# Patient Record
Sex: FEMALE | Race: WHITE | NOT HISPANIC OR LATINO | Employment: UNEMPLOYED | URBAN - NONMETROPOLITAN AREA
[De-identification: names, ages, dates, MRNs, and addresses within clinical notes are randomized per-mention and may not be internally consistent; named-entity substitution may affect disease eponyms.]

---

## 2018-07-23 ENCOUNTER — TELEMEDICINE ORIGINATING SITE VISIT (OUTPATIENT)
Dept: MEDICAL GROUP | Facility: CLINIC | Age: 58
End: 2018-07-23
Payer: MEDICARE

## 2018-07-23 ENCOUNTER — TELEMEDICINE2 (OUTPATIENT)
Dept: MEDICAL GROUP | Age: 58
End: 2018-07-23
Payer: MEDICARE

## 2018-07-23 VITALS
HEIGHT: 68 IN | HEART RATE: 78 BPM | TEMPERATURE: 97.4 F | BODY MASS INDEX: 44.41 KG/M2 | OXYGEN SATURATION: 84 % | SYSTOLIC BLOOD PRESSURE: 150 MMHG | WEIGHT: 293 LBS | DIASTOLIC BLOOD PRESSURE: 73 MMHG

## 2018-07-23 DIAGNOSIS — B95.62 CELLULITIS DUE TO MRSA: ICD-10-CM

## 2018-07-23 DIAGNOSIS — L03.90 CELLULITIS DUE TO MRSA: ICD-10-CM

## 2018-07-23 DIAGNOSIS — E78.5 DYSLIPIDEMIA: ICD-10-CM

## 2018-07-23 DIAGNOSIS — G47.33 OSA (OBSTRUCTIVE SLEEP APNEA): ICD-10-CM

## 2018-07-23 DIAGNOSIS — M54.42 CHRONIC BILATERAL LOW BACK PAIN WITH BILATERAL SCIATICA: ICD-10-CM

## 2018-07-23 DIAGNOSIS — E66.01 CLASS 3 SEVERE OBESITY DUE TO EXCESS CALORIES WITHOUT SERIOUS COMORBIDITY WITH BODY MASS INDEX (BMI) OF 60.0 TO 69.9 IN ADULT (HCC): ICD-10-CM

## 2018-07-23 DIAGNOSIS — J45.30 ASTHMA IN ADULT, MILD PERSISTENT, UNCOMPLICATED: ICD-10-CM

## 2018-07-23 DIAGNOSIS — I10 ESSENTIAL HYPERTENSION: ICD-10-CM

## 2018-07-23 DIAGNOSIS — M54.41 CHRONIC BILATERAL LOW BACK PAIN WITH BILATERAL SCIATICA: ICD-10-CM

## 2018-07-23 DIAGNOSIS — G89.29 CHRONIC BILATERAL LOW BACK PAIN WITH BILATERAL SCIATICA: ICD-10-CM

## 2018-07-23 DIAGNOSIS — Z79.4 TYPE 2 DIABETES MELLITUS WITH RIGHT EYE AFFECTED BY RETINOPATHY AND MACULAR EDEMA, WITH LONG-TERM CURRENT USE OF INSULIN, UNSPECIFIED RETINOPATHY SEVERITY (HCC): ICD-10-CM

## 2018-07-23 DIAGNOSIS — E11.311 TYPE 2 DIABETES MELLITUS WITH RIGHT EYE AFFECTED BY RETINOPATHY AND MACULAR EDEMA, WITH LONG-TERM CURRENT USE OF INSULIN, UNSPECIFIED RETINOPATHY SEVERITY (HCC): ICD-10-CM

## 2018-07-23 DIAGNOSIS — E55.9 VITAMIN D DEFICIENCY: ICD-10-CM

## 2018-07-23 PROBLEM — E11.39 TYPE 2 DIABETES MELLITUS WITH OPHTHALMIC COMPLICATION, WITH LONG-TERM CURRENT USE OF INSULIN (HCC): Status: ACTIVE | Noted: 2018-07-23

## 2018-07-23 PROCEDURE — 99204 OFFICE O/P NEW MOD 45 MIN: CPT | Performed by: INTERNAL MEDICINE

## 2018-07-23 RX ORDER — ALBUTEROL SULFATE 2.5 MG/3ML
2.5 SOLUTION RESPIRATORY (INHALATION) EVERY 4 HOURS PRN
COMMUNITY
End: 2019-02-11 | Stop reason: SDUPTHER

## 2018-07-23 RX ORDER — MONTELUKAST SODIUM 10 MG/1
10 TABLET ORAL DAILY
COMMUNITY
End: 2019-01-10 | Stop reason: SDUPTHER

## 2018-07-23 RX ORDER — ALBUTEROL SULFATE 90 UG/1
2 AEROSOL, METERED RESPIRATORY (INHALATION) EVERY 6 HOURS PRN
COMMUNITY

## 2018-07-23 RX ORDER — IBUPROFEN 800 MG/1
800 TABLET ORAL EVERY 8 HOURS PRN
COMMUNITY
End: 2019-01-10 | Stop reason: SDUPTHER

## 2018-07-23 RX ORDER — FUROSEMIDE 80 MG
80 TABLET ORAL DAILY
COMMUNITY
End: 2019-01-10 | Stop reason: SDUPTHER

## 2018-07-23 RX ORDER — AMLODIPINE BESYLATE 10 MG/1
10 TABLET ORAL DAILY
COMMUNITY
End: 2019-01-10 | Stop reason: SDUPTHER

## 2018-07-23 RX ORDER — ROSUVASTATIN CALCIUM 20 MG/1
20 TABLET, COATED ORAL EVERY EVENING
COMMUNITY
End: 2019-06-18 | Stop reason: SDUPTHER

## 2018-07-23 RX ORDER — OMEPRAZOLE 40 MG/1
40 CAPSULE, DELAYED RELEASE ORAL DAILY
COMMUNITY
End: 2019-01-10 | Stop reason: SDUPTHER

## 2018-07-23 RX ORDER — GABAPENTIN 300 MG/1
300 CAPSULE ORAL 3 TIMES DAILY
COMMUNITY
End: 2019-01-10 | Stop reason: SDUPTHER

## 2018-07-23 ASSESSMENT — PATIENT HEALTH QUESTIONNAIRE - PHQ9
SUM OF ALL RESPONSES TO PHQ QUESTIONS 1-9: 4
CLINICAL INTERPRETATION OF PHQ2 SCORE: 1
5. POOR APPETITE OR OVEREATING: 0 - NOT AT ALL

## 2018-07-23 ASSESSMENT — PAIN SCALES - GENERAL: PAINLEVEL: NO PAIN

## 2018-07-24 PROBLEM — M54.42 CHRONIC BILATERAL LOW BACK PAIN WITH BILATERAL SCIATICA: Status: ACTIVE | Noted: 2018-07-24

## 2018-07-24 PROBLEM — M54.41 CHRONIC BILATERAL LOW BACK PAIN WITH BILATERAL SCIATICA: Status: ACTIVE | Noted: 2018-07-24

## 2018-07-24 PROBLEM — G89.29 CHRONIC BILATERAL LOW BACK PAIN WITH BILATERAL SCIATICA: Status: ACTIVE | Noted: 2018-07-24

## 2018-07-24 ASSESSMENT — ENCOUNTER SYMPTOMS
WEIGHT LOSS: 1
DIZZINESS: 0
RESPIRATORY NEGATIVE: 1
CHILLS: 0
BACK PAIN: 1
GASTROINTESTINAL NEGATIVE: 1
WEAKNESS: 0
ABDOMINAL PAIN: 0
PALPITATIONS: 0
NAUSEA: 0
BLOOD IN STOOL: 0
BLURRED VISION: 1
HEADACHES: 0
SHORTNESS OF BREATH: 0
PND: 0
PSYCHIATRIC NEGATIVE: 1
DIARRHEA: 0
COUGH: 0
CARDIOVASCULAR NEGATIVE: 1
VOMITING: 0
MYALGIAS: 0
FEVER: 0
SENSORY CHANGE: 1
ORTHOPNEA: 0

## 2018-07-24 NOTE — PROGRESS NOTES
Subjective:      Eddie Mccord is a 58 y.o. female who presents with Other; Follow-Up; and Establish Care            HPI 58-year-old female here today to establish medical care  Moved to [facility] one month ago from [facility].  working in [place] with new job    1. Asthma/COPD  Intermittent asthma stable with albuterol when necessary. No history of acute asthma exacerbations, hospitalizations or intubations related to asthma.    2. HERIBERTO.  Diagnosed in [facility]. Using C Pap machine with O2. Patient does not use oxygen during the day, does not want to carry oxygen tanks. Most recent sleep apnea adjustment/titration was 2 years ago.    3. Type II diabetes  Diabetes diagnosed 4 years ago. Associated with diabetic retinopathy. Was followed by endocrinologist in [place], most recent hemoglobin A1c 6.7. Patient treated with Jardiance, metformin, Victoza, Tresiba. Labs are due. Patient completed diabetic eye exam on a 2018, results showed macular degeneration, diabetic retinopathy. Patient is not consistently checking her blood sugars. Patient denies any episodes of hypoglycemia.    4. Dyslipidemia.  Treated with Crestor. Lipid panel to    5. Hypertension  Treated with Norvasc 10 mg daily. Patient discontinue lisinopril secondary to side effects of palpitations. Does not really check her blood pressure    6. History of MRSA cellulitis.  Patient had extensive hospitalization in 2006 secondary to MRSA cellulitis. Patient has history of lymphedema of left lower leg as a result. Patient was followed by wound care and is stable now.    7. Chronic low back pain  Patient treats back pain with gabapentin and Motrin when necessary.    8. Obesity, BMI 62.92  Patient's prior weight was 460 pounds and is now down to 413 pounds. She lost weight with portion control and increase physical activity since moving to [facility].    9. Prevention  Mammogram greater than 5 years ago. Never had colonoscopy. Pap smear many  "years ago.     Review of Systems   Constitutional: Positive for weight loss. Negative for chills, fever and malaise/fatigue.        Intentional 40+ weight loss   HENT: Negative.  Negative for congestion and ear pain.    Eyes: Positive for blurred vision.   Respiratory: Negative.  Negative for cough and shortness of breath.    Cardiovascular: Negative.  Negative for chest pain, palpitations, orthopnea, leg swelling and PND.   Gastrointestinal: Negative.  Negative for abdominal pain, blood in stool, diarrhea, melena, nausea and vomiting.   Genitourinary: Negative.  Negative for frequency and urgency.   Musculoskeletal: Positive for back pain. Negative for joint pain and myalgias.   Skin: Negative.  Negative for rash.   Neurological: Positive for sensory change. Negative for dizziness, weakness and headaches.        Lower extremity paresthesia   Psychiatric/Behavioral: Negative.    All other systems reviewed and are negative.         Objective:     /73   Pulse 78   Temp 36.3 °C (97.4 °F)   Ht 1.727 m (5' 8\")   Wt (!) 187.7 kg (413 lb 12.8 oz)   SpO2 (!) 84%   Breastfeeding? No   BMI 62.92 kg/m²      Physical Exam   Constitutional: She is oriented to person, place, and time. She appears well-developed and well-nourished.   Obese   HENT:   Head: Normocephalic and atraumatic.   Right Ear: External ear normal.   Left Ear: External ear normal.   Nose: Nose normal.   Mouth/Throat: Oropharynx is clear and moist. No oropharyngeal exudate.   Eyes: Conjunctivae and EOM are normal. Pupils are equal, round, and reactive to light.   Neck: Normal range of motion. Neck supple. No thyromegaly present.   Cardiovascular: Normal rate, regular rhythm, S1 normal, S2 normal and intact distal pulses.  Exam reveals no gallop and no friction rub.    No murmur heard.  Decreased heart sounds due to body habitus   Pulmonary/Chest: Effort normal. No respiratory distress. She has no wheezes. She has no rales.   Decreased breath sounds " due to body habitus   Abdominal: Soft. Bowel sounds are normal. She exhibits no distension. There is no tenderness. There is no rebound and no guarding.   Mild reducible umbilical herniation   Musculoskeletal: Normal range of motion. She exhibits edema.   +1 pitting edema lower extremities bilaterally. No erythema.   Lymphadenopathy:     She has no cervical adenopathy.   Neurological: She is alert and oriented to person, place, and time. She has normal strength and normal reflexes. She displays normal reflexes. No cranial nerve deficit or sensory deficit. Gait normal.   Skin: Skin is warm and dry. No erythema.   Psychiatric: She has a normal mood and affect. Her behavior is normal. Judgment normal.   Nursing note and vitals reviewed.              Assessment/Plan:     1. Asthma in adult, mild persistent, uncomplicated  Stable, albuterol when necessary    2. HERIBERTO (obstructive sleep apnea)  Discussed pulmonology referral for continued C Pap and oxygen    3. 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 Dyslipidemia  Continue Crestor at current dose    - LIPID PROFILE; Future    5. Essential hypertension  Blood pressure stable on Norvasc    6. Vitamin D deficiency    - VITAMIN D,25 HYDROXY; Future    7. Class 3 severe obesity due to excess calories without serious comorbidity with body mass index (BMI) of 60.0 to 69.9 in adult (HCC)    - TSH; Future    8. Cellulitis due to MRSA  Stable, noted    9. Chronic bilateral low back pain with bilateral sciatica  Continue current treatment  Recommend continued weight loss  HEP for core  strengthening

## 2018-08-14 ENCOUNTER — NON-PROVIDER VISIT (OUTPATIENT)
Dept: MEDICAL GROUP | Facility: CLINIC | Age: 58
End: 2018-08-14
Payer: MEDICARE

## 2018-08-14 DIAGNOSIS — E78.5 DYSLIPIDEMIA: ICD-10-CM

## 2018-08-14 PROCEDURE — 36415 COLL VENOUS BLD VENIPUNCTURE: CPT | Performed by: INTERNAL MEDICINE

## 2018-08-14 NOTE — NON-PROVIDER
Eddie Ricardo is a 58 y.o. female here for a non-provider visit for Venipuncture    If abnormal was an in office provider notified today (if so, indicate provider)? Yes  Routed to PCP? Yes

## 2018-08-20 ENCOUNTER — TELEMEDICINE2 (OUTPATIENT)
Dept: MEDICAL GROUP | Age: 58
End: 2018-08-20
Payer: MEDICARE

## 2018-08-20 ENCOUNTER — TELEMEDICINE ORIGINATING SITE VISIT (OUTPATIENT)
Dept: MEDICAL GROUP | Facility: CLINIC | Age: 58
End: 2018-08-20
Payer: MEDICARE

## 2018-08-20 VITALS
TEMPERATURE: 97.9 F | WEIGHT: 293 LBS | HEIGHT: 68 IN | HEART RATE: 83 BPM | SYSTOLIC BLOOD PRESSURE: 161 MMHG | OXYGEN SATURATION: 81 % | DIASTOLIC BLOOD PRESSURE: 74 MMHG | BODY MASS INDEX: 44.41 KG/M2

## 2018-08-20 DIAGNOSIS — E78.5 DYSLIPIDEMIA: ICD-10-CM

## 2018-08-20 DIAGNOSIS — E11.311 TYPE 2 DIABETES MELLITUS WITH RIGHT EYE AFFECTED BY RETINOPATHY AND MACULAR EDEMA, WITH LONG-TERM CURRENT USE OF INSULIN, UNSPECIFIED RETINOPATHY SEVERITY (HCC): ICD-10-CM

## 2018-08-20 DIAGNOSIS — I10 ESSENTIAL HYPERTENSION: ICD-10-CM

## 2018-08-20 DIAGNOSIS — G47.33 OSA (OBSTRUCTIVE SLEEP APNEA): ICD-10-CM

## 2018-08-20 DIAGNOSIS — Z79.4 TYPE 2 DIABETES MELLITUS WITH RIGHT EYE AFFECTED BY RETINOPATHY AND MACULAR EDEMA, WITH LONG-TERM CURRENT USE OF INSULIN, UNSPECIFIED RETINOPATHY SEVERITY (HCC): ICD-10-CM

## 2018-08-20 PROCEDURE — 92250 FUNDUS PHOTOGRAPHY W/I&R: CPT | Mod: TC | Performed by: INTERNAL MEDICINE

## 2018-08-20 PROCEDURE — 99214 OFFICE O/P EST MOD 30 MIN: CPT | Performed by: INTERNAL MEDICINE

## 2018-08-20 ASSESSMENT — PAIN SCALES - GENERAL: PAINLEVEL: NO PAIN

## 2018-08-21 ENCOUNTER — NON-PROVIDER VISIT (OUTPATIENT)
Dept: MEDICAL GROUP | Facility: CLINIC | Age: 58
End: 2018-08-21
Payer: MEDICARE

## 2018-08-21 NOTE — NON-PROVIDER
Eddie Ricardo is a 58 y.o. female here for a non-provider visit for Retinal Scan    If abnormal was an in office provider notified today (if so, indicate provider)? Yes  Routed to PCP? Yes

## 2018-08-22 LAB — RETINAL SCREEN: POSITIVE

## 2018-08-23 ENCOUNTER — TELEPHONE (OUTPATIENT)
Dept: MEDICAL GROUP | Facility: CLINIC | Age: 58
End: 2018-08-23

## 2018-08-23 NOTE — PROGRESS NOTES
CC: Follow-up lab work    Verified identification with patient. Secured video conference with RN presenter in [facility]      HPI:   Eddie presents today with the following.    1. HERIBERTO (obstructive sleep apnea)  Using C Pap. Patient needs to wait until end of September, beginning of October to receive her oxygen through [facility] due to insurance coverage. Patient needs 24 7 oxygen with a concentrator.    2. 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 Associated with diabetic retinopathy. Was followed by endocrinologist in [place], most recent hemoglobin A1c 6.7. Patient treated with Jardiance, metformin, Victoza, Tresiba. Labs are due. Patient completed diabetic eye exam on a 2018, results showed macular degeneration, diabetic retinopathy. Patient is not consistently checking her blood sugars. Patient denies any episodes of hypoglycemia    Date 8/20/18: Labs: Hemoglobin A1c 6.3, urine microalbumin ratio slightly elevated at 63. CMP within normal limits. Most recent visit with ophthalmologist was in [facility] in May. Positive retinopathy.  Requesting refill for Triseba  Blood sugars on average range in the low 100s.    3. Dyslipidemia  Taking Crestor 20 mg daily  Total cholesterol 155, triglyceride 160, HDL 41, LDL 87    4. Essential hypertension  Patient takes Norvasc 10 mg tablets and Lasix.  Intolerant to other blood pressure medications  Repeat blood pressure 145/77  Does not have a blood pressure monitor.    Labs: TSH 2.3, vitamin D 33    Patient Active Problem List    Diagnosis Date Noted   • Chronic bilateral low back pain with bilateral sciatica 07/24/2018   • Asthma in adult, mild persistent, uncomplicated 07/23/2018   • HERIBERTO (obstructive sleep apnea) 07/23/2018   • Type 2 diabetes mellitus with ophthalmic complication, with long-term current use  of insulin (HCC) 07/23/2018   • Dyslipidemia 07/23/2018   • Essential hypertension 07/23/2018   • Cellulitis due to MRSA 07/23/2018       Current Outpatient Prescriptions   Medication Sig Dispense Refill   • amLODIPine (NORVASC) 10 MG Tab Take 10 mg by mouth every day.     • furosemide (LASIX) 80 MG Tab Take 80 mg by mouth every day.     • gabapentin (NEURONTIN) 300 MG Cap Take 300 mg by mouth 3 times a day.     • ibuprofen (MOTRIN) 800 MG Tab Take 800 mg by mouth every 8 hours as needed.     • Empagliflozin (JARDIANCE) 10 MG Tab Take  by mouth.     • metformin (GLUCOPHAGE) 1000 MG tablet Take 1,000 mg by mouth 2 times a day, with meals.     • montelukast (SINGULAIR) 10 MG Tab Take 10 mg by mouth every day.     • omeprazole (PRILOSEC) 40 MG delayed-release capsule Take 40 mg by mouth every day.     • rosuvastatin (CRESTOR) 20 MG Tab Take 20 mg by mouth every evening.     • Insulin Degludec (TRESIBA FLEXTOUCH) 100 UNIT/ML Solution Pen-injector Inject  as instructed.     • aspirin EC (ECOTRIN) 81 MG Tablet Delayed Response Take 81 mg by mouth every day.     • albuterol (PROVENTIL) 2.5mg/3ml Nebu Soln solution for nebulization 2.5 mg by Nebulization route every four hours as needed for Shortness of Breath.     • ipratropium (ATROVENT) 17 MCG/ACT Aero Soln Inhale 2 Puffs by mouth every 6 hours.     • mupirocin (BACTROBAN) 2 % Ointment Apply  to affected area(s) every day.     • albuterol 108 (90 Base) MCG/ACT Aero Soln inhalation aerosol Inhale 2 Puffs by mouth every 6 hours as needed for Shortness of Breath.     • liraglutide (VICTOZA) 18 MG/3ML Solution Pen-injector injection Inject 0.6 mg as instructed every day.       No current facility-administered medications for this visit.          Allergies as of 08/20/2018 - Reviewed 08/20/2018   Allergen Reaction Noted   • Codeine  07/23/2018   • Latex  07/23/2018        ROS: As per HPI. Fatigue, mild shortness of breath, back pain. Patient denies all other cardiopulmonary,  "GI, neurologic symptoms.    BP (!) 161/74   Pulse 83   Temp 36.6 °C (97.9 °F)   Ht 1.727 m (5' 8\")   Wt (!) 190.1 kg (419 lb 3.2 oz)   SpO2 (!) 81%   BMI 63.74 kg/m²     Physical Exam:  Gen:         Alert and oriented, No apparent distress.  Neck:        No Lymphadenopathy or Bruits.  Lungs:     Mild decreased breath sounds throughout secondary to body habitus. No wheezing rhonchi or crackles noted  CV:          Regular rate and rhythm. No murmurs, rubs or gallops.  Abd:         Soft non tender, non distended. Normal active bowel sounds.  No  Hepatosplenomegaly, No pulsatile masses.                   Ext:          No clubbing, cyanosis, edema.      Assessment and Plan.   58 y.o. female with the following issues.    1. HERIBERTO (obstructive sleep apnea)  Await approval for O2 concentrator  Continue C Pap    2. 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 BMI 62.92  Referral to nutritionist    - POCT Retinal Eye Exam  - REFERRAL TO DIABETIC EDUCATION Diabetes Self Management Education / Training (DSME/T) and Medical Nutrition Therapy (MNT): Initial Group DSME/MNT as authorized by payor; DSME/T Content: Nutritional Management    3. Dyslipidemia  Stable, continue Crestor    4. Essential hypertension  Stable, continue current plan of care with antihypertensives.            Please note that this dictation was created using voice recognition software. I have made every reasonable attempt to correct obvious errors, but expect that there are errors of grammar and possible content that I did not discover before finalizing note.   "

## 2018-08-30 ENCOUNTER — TELEPHONE (OUTPATIENT)
Dept: MEDICAL GROUP | Facility: CLINIC | Age: 58
End: 2018-08-30

## 2018-09-12 ENCOUNTER — TELEMEDICINE ORIGINATING SITE VISIT (OUTPATIENT)
Dept: MEDICAL GROUP | Facility: CLINIC | Age: 58
End: 2018-09-12
Payer: MEDICARE

## 2018-09-12 ENCOUNTER — NON-PROVIDER VISIT (OUTPATIENT)
Dept: MEDICAL GROUP | Facility: CLINIC | Age: 58
End: 2018-09-12
Payer: MEDICARE

## 2018-09-12 ENCOUNTER — TELEMEDICINE2 (OUTPATIENT)
Dept: MEDICAL GROUP | Age: 58
End: 2018-09-12
Payer: MEDICARE

## 2018-09-12 ENCOUNTER — APPOINTMENT (OUTPATIENT)
Dept: RADIOLOGY | Facility: IMAGING CENTER | Age: 58
End: 2018-09-12
Attending: INTERNAL MEDICINE
Payer: MEDICARE

## 2018-09-12 VITALS
TEMPERATURE: 97.5 F | DIASTOLIC BLOOD PRESSURE: 73 MMHG | HEIGHT: 68 IN | BODY MASS INDEX: 44.41 KG/M2 | SYSTOLIC BLOOD PRESSURE: 168 MMHG | OXYGEN SATURATION: 84 % | WEIGHT: 293 LBS | HEART RATE: 86 BPM

## 2018-09-12 DIAGNOSIS — J44.89 ASTHMA WITH CHRONIC OBSTRUCTIVE PULMONARY DISEASE (COPD): ICD-10-CM

## 2018-09-12 DIAGNOSIS — R09.02 HYPOXIA: ICD-10-CM

## 2018-09-12 DIAGNOSIS — E66.01 CLASS 3 SEVERE OBESITY DUE TO EXCESS CALORIES WITH SERIOUS COMORBIDITY AND BODY MASS INDEX (BMI) OF 60.0 TO 69.9 IN ADULT (HCC): ICD-10-CM

## 2018-09-12 DIAGNOSIS — I10 ESSENTIAL HYPERTENSION: ICD-10-CM

## 2018-09-12 DIAGNOSIS — G47.33 OSA (OBSTRUCTIVE SLEEP APNEA): ICD-10-CM

## 2018-09-12 PROCEDURE — 94010 BREATHING CAPACITY TEST: CPT | Performed by: INTERNAL MEDICINE

## 2018-09-12 PROCEDURE — 71046 X-RAY EXAM CHEST 2 VIEWS: CPT | Mod: TC,FY | Performed by: INTERNAL MEDICINE

## 2018-09-12 PROCEDURE — 99214 OFFICE O/P EST MOD 30 MIN: CPT | Performed by: INTERNAL MEDICINE

## 2018-09-12 NOTE — NON-PROVIDER
Eddie Ricardo is a 58 y.o. female here for a non-provider visit for Spirometry    If abnormal was an in office provider notified today (if so, indicate provider)? Yes  Routed to PCP? Yes

## 2018-09-12 NOTE — PROGRESS NOTES
CC: Requesting portable oxygen with conserving device    Verified identification with patient. Secured video conference with RN presenter in [facility]      HPI:   dEdie presents today with the following.    1. HERIBERTO (obstructive sleep apnea)  Patient compliant with her nightly CPAP use with her oxygen. Patient requiring about 5 L of oxygen per nasal cannula. Requesting portable oxygen with conserving device.  O2 saturation on ambulation is 84% without oxygen. O2 saturations at 92% with oxygen in the clinic.    2. Essential hypertension  Patient treated with Norvasc 10 mg daily. Intolerant to ace inhibitors which caused severe palpitations. Checks blood pressure with a cough at home, ranging 120 to 130/70. Patient denies chest pain, headache, dizziness, palpitations or edema    4. Class 3 severe obesity due to excess calories with serious comorbidity and body mass index (BMI) of 60.0 to 69.9 in adult ([facility])  Patient is very limited in her physical activity due to low back pain, shortness of breath, joint pains mostly in her knees. Patient trying to follow diabetic diet.      Patient Active Problem List    Diagnosis Date Noted   • Asthma with chronic obstructive pulmonary disease (COPD) ([facility]) 09/12/2018   • Class 3 severe obesity due to excess calories with serious comorbidity and body mass index (BMI) of 60.0 to 69.9 in adult ([facility]) 09/12/2018   • Chronic bilateral low back pain with bilateral sciatica 07/24/2018   • Asthma in adult, mild persistent, uncomplicated 07/23/2018   • HERIBERTO (obstructive sleep apnea) 07/23/2018   • Type 2 diabetes mellitus with ophthalmic complication, with long-term current use of insulin ([facility]) 07/23/2018   • Dyslipidemia 07/23/2018   • Essential hypertension 07/23/2018   • Cellulitis due to MRSA 07/23/2018       Current Outpatient Prescriptions   Medication Sig Dispense Refill   • NON SPECIFIED Inogen    Requesting Portable O2 with conserving device  Requiring 3-5 L/nc 24/7    84% O2 sat  "on ambulation without O2  92% O2 sat with O2    Dx: R09.02, G47.33, J44.9 1 Each 11   • liraglutide (VICTOZA) 18 MG/3ML Solution Pen-injector injection Inject 0.3 mL as instructed every day. 9 mL 5   • Insulin Degludec (TRESIBA FLEXTOUCH) 100 UNIT/ML Solution Pen-injector Inject 69 Units as instructed every bedtime. 1 PEN 5   • amLODIPine (NORVASC) 10 MG Tab Take 10 mg by mouth every day.     • ipratropium (ATROVENT) 17 MCG/ACT Aero Soln Inhale 2 Puffs by mouth every 6 hours.     • furosemide (LASIX) 80 MG Tab Take 80 mg by mouth every day.     • gabapentin (NEURONTIN) 300 MG Cap Take 300 mg by mouth 3 times a day.     • ibuprofen (MOTRIN) 800 MG Tab Take 800 mg by mouth every 8 hours as needed.     • Empagliflozin (JARDIANCE) 10 MG Tab Take  by mouth.     • metformin (GLUCOPHAGE) 1000 MG tablet Take 1,000 mg by mouth 2 times a day, with meals.     • montelukast (SINGULAIR) 10 MG Tab Take 10 mg by mouth every day.     • mupirocin (BACTROBAN) 2 % Ointment Apply  to affected area(s) every day.     • omeprazole (PRILOSEC) 40 MG delayed-release capsule Take 40 mg by mouth every day.     • rosuvastatin (CRESTOR) 20 MG Tab Take 20 mg by mouth every evening.     • aspirin EC (ECOTRIN) 81 MG Tablet Delayed Response Take 81 mg by mouth every day.     • NON SPECIFIED Tresiba injector needles  31 guage, 1/4\" 30 Each 11   • albuterol (PROVENTIL) 2.5mg/3ml Nebu Soln solution for nebulization 2.5 mg by Nebulization route every four hours as needed for Shortness of Breath.     • albuterol 108 (90 Base) MCG/ACT Aero Soln inhalation aerosol Inhale 2 Puffs by mouth every 6 hours as needed for Shortness of Breath.       No current facility-administered medications for this visit.          Allergies as of 09/12/2018 - Reviewed 09/12/2018   Allergen Reaction Noted   • Codeine  07/23/2018   • Latex  07/23/2018        ROS: As per HPI. Denies all other cardiac pulmonary, GI, neurologic symptoms.    BP (!) 168/73   Pulse 86   Temp 36.4 °C " "(97.5 °F)   Ht 1.727 m (5' 8\")   Wt (!) 191 kg (421 lb)   SpO2 (!) 84%   BMI 64.01 kg/m²     Physical Exam:  Gen:         Alert and oriented, No apparent distress.  Neck:        No Lymphadenopathy or Bruits.  Lungs:     Diminished breath sounds throughout, no wheezing rhonchi or crackles noted  CV:          Decreased heart sounds. Regular rate and rhythm. No murmurs, rubs or gallops.  Abd:         Soft non tender, non distended. Normal active bowel sounds.  No  Hepatosplenomegaly, No pulsatile masses.                   Ext:          No lower extremity pitting edema.      Assessment and Plan.   58 y.o. female with the following issues.    1. HERIBERTO (obstructive sleep apnea)  Continue with nightly CPAP machine as prescribed with her oxygen  Briefly discussed and mentioned weight loss    - DX-CHEST-2 VIEWS; Future  - PULMONARY FUNCTION TESTS Test requested: Spirometry with-out & with Bronchodilator  - NON SPECIFIED; Inogen    Requesting Portable O2 with conserving device  Requiring 3-5 L/nc 24/7    84% O2 sat on ambulation without O2  92% O2 sat with O2    Dx: R09.02, G47.33, J44.9  Dispense: 1 Each; Refill: 11    2. Asthma with chronic obstructive pulmonary disease (COPD) ([facility])  Albuterol when necessary    - DX-CHEST-2 VIEWS; Future  - PULMONARY FUNCTION TESTS Test requested: Spirometry with-out & with Bronchodilator  - NON SPECIFIED; Inogen    Requesting Portable O2 with conserving device  Requiring 3-5 L/nc 24/7    84% O2 sat on ambulation without O2  92% O2 sat with O2    Dx: R09.02, G47.33, J44.9  Dispense: 1 Each; Refill: 11    3. Essential hypertension  Continue Norvasc 10 mg daily.  Monitor blood pressure at home  Low-sodium diet, weight loss    4. Class 3 severe obesity due to excess calories with serious comorbidity and body mass index (BMI) of 60.0 to 69.9 in adult ([facility])  Consider bariatric surgery    5. Hypoxia  History of DVT/PE in April 2006.    - NON SPECIFIED; Inogen    Requesting Portable O2 with " conserving device  Requiring 3-5 L/nc 24/7    84% O2 sat on ambulation without O2  92% O2 sat with O2    Dx: R09.02, G47.33, J44.9  Dispense: 1 Each; Refill: 11            Please note that this dictation was created using voice recognition software. I have made every reasonable attempt to correct obvious errors, but expect that there are errors of grammar and possible content that I did not discover before finalizing note.

## 2018-09-17 ENCOUNTER — TELEMEDICINE ORIGINATING SITE VISIT (OUTPATIENT)
Dept: MEDICAL GROUP | Facility: CLINIC | Age: 58
End: 2018-09-17

## 2018-09-17 ENCOUNTER — TELEPHONE (OUTPATIENT)
Dept: MEDICAL GROUP | Facility: CLINIC | Age: 58
End: 2018-09-17

## 2018-09-17 NOTE — TELEPHONE ENCOUNTER
Pt called and inquired about latest X-rays. Informed patient they would be reviewed and we would get back to her. Please advise.

## 2018-09-26 ENCOUNTER — TELEMEDICINE ORIGINATING SITE VISIT (OUTPATIENT)
Dept: MEDICAL GROUP | Facility: CLINIC | Age: 58
End: 2018-09-26
Payer: MEDICARE

## 2018-09-26 ENCOUNTER — TELEMEDICINE2 (OUTPATIENT)
Dept: MEDICAL GROUP | Age: 58
End: 2018-09-26
Payer: MEDICARE

## 2018-09-26 VITALS
BODY MASS INDEX: 44.41 KG/M2 | HEIGHT: 68 IN | WEIGHT: 293 LBS | SYSTOLIC BLOOD PRESSURE: 154 MMHG | DIASTOLIC BLOOD PRESSURE: 66 MMHG | HEART RATE: 81 BPM | OXYGEN SATURATION: 76 % | TEMPERATURE: 97.9 F

## 2018-09-26 DIAGNOSIS — J44.89 ASTHMA WITH CHRONIC OBSTRUCTIVE PULMONARY DISEASE (COPD): ICD-10-CM

## 2018-09-26 DIAGNOSIS — E11.9 TYPE 2 DIABETES MELLITUS TREATED WITH INSULIN (HCC): ICD-10-CM

## 2018-09-26 DIAGNOSIS — G47.33 OSA (OBSTRUCTIVE SLEEP APNEA): ICD-10-CM

## 2018-09-26 DIAGNOSIS — H65.02 ACUTE SEROUS OTITIS MEDIA OF LEFT EAR, RECURRENCE NOT SPECIFIED: ICD-10-CM

## 2018-09-26 DIAGNOSIS — Z79.4 TYPE 2 DIABETES MELLITUS TREATED WITH INSULIN (HCC): ICD-10-CM

## 2018-09-26 PROCEDURE — 99214 OFFICE O/P EST MOD 30 MIN: CPT | Performed by: INTERNAL MEDICINE

## 2018-09-26 RX ORDER — AMOXICILLIN AND CLAVULANATE POTASSIUM 875; 125 MG/1; MG/1
1 TABLET, FILM COATED ORAL 2 TIMES DAILY
Qty: 10 TAB | Refills: 0 | Status: SHIPPED | OUTPATIENT
Start: 2018-09-26 | End: 2019-02-11

## 2018-09-26 NOTE — PROGRESS NOTES
CC: One-month follow-up visit    Verified identification with patient. Secured video conference with [facility] present[facility]      HPI:   Eddie presents today with the following.    1. Asthma with chronic obstructive pulmonary disease (COPD) ([facility])  Patient here for follow-up of chest x-ray and pulmonary function tests.  Chest x-ray shows mild linear and poorly defined opacification in the left lower lung base which could be scarring or atelectasis or developing infiltrate. Clinically asymptomatic.  Patient using Atrovent and albuterol as needed.  Pulmonary function tests show severe obstruction  Patient will occasionally use oxygen at 4 L nasal cannula. Does not have a pulse ox for monitoring. Patient is still waiting to be eligible for her portable oxygen.    2. HERIBERTO (obstructive sleep apnea)  Patient compliant with her nightly CPAP use with her oxygen. Patient requiring about 5 L of oxygen per nasal cannula. Patient was 1st evaluated for obstructive sleep apnea 5 years ago in the [facility]. Patient is due for a new CPAP machine. Patient was last seen by pulmonologist 5 years ago.    3. Type 2 diabetes mellitus treated with insulin ([facility])  Patient states that her Jardiance  is no longer covered by insurance. This medication has been extremely effective in lowering her A1c  Patient also treated with Victoza and Tresiba    4. Acute serous otitis media of left ear, recurrence not specified  Patient complains of left ear fullness, occasional clear drainage. History of psoriasis and has used eardrops in the past. Symptoms worsening over the last 4 days associated tenderness behind her ear, nasal congestion. Patient denies fevers or chills. Patient continues to take Singulair. Over-the-counter ibuprofen.      Patient Active Problem List    Diagnosis Date Noted   • Asthma with chronic obstructive pulmonary disease (COPD) ([facility]) 09/12/2018   • Class 3 severe obesity due to excess calories with serious  comorbidity and body mass index (BMI) of 60.0 to 69.9 in adult ([facility]) 09/12/2018   • Chronic bilateral low back pain with bilateral sciatica 07/24/2018   • Asthma in adult, mild persistent, uncomplicated 07/23/2018   • HERIBERTO (obstructive sleep apnea) 07/23/2018   • Type 2 diabetes mellitus with ophthalmic complication, with long-term current use of insulin ([facility]) 07/23/2018   • Dyslipidemia 07/23/2018   • Essential hypertension 07/23/2018   • Cellulitis due to MRSA 07/23/2018       Current Outpatient Prescriptions   Medication Sig Dispense Refill   • liraglutide (VICTOZA) 18 MG/3ML Solution Pen-injector injection Inject 0.3 mL as instructed every day. 9 mL 5   • Insulin Degludec (TRESIBA FLEXTOUCH) 100 UNIT/ML Solution Pen-injector Inject 69 Units as instructed every bedtime. 1 PEN 5   • albuterol (PROVENTIL) 2.5mg/3ml Nebu Soln solution for nebulization 2.5 mg by Nebulization route every four hours as needed for Shortness of Breath.     • amLODIPine (NORVASC) 10 MG Tab Take 10 mg by mouth every day.     • furosemide (LASIX) 80 MG Tab Take 80 mg by mouth every day.     • gabapentin (NEURONTIN) 300 MG Cap Take 300 mg by mouth 3 times a day.     • ibuprofen (MOTRIN) 800 MG Tab Take 800 mg by mouth every 8 hours as needed.     • Empagliflozin (JARDIANCE) 10 MG Tab Take  by mouth.     • metformin (GLUCOPHAGE) 1000 MG tablet Take 1,000 mg by mouth 2 times a day, with meals.     • montelukast (SINGULAIR) 10 MG Tab Take 10 mg by mouth every day.     • omeprazole (PRILOSEC) 40 MG delayed-release capsule Take 40 mg by mouth every day.     • rosuvastatin (CRESTOR) 20 MG Tab Take 20 mg by mouth every evening.     • aspirin EC (ECOTRIN) 81 MG Tablet Delayed Response Take 81 mg by mouth every day.     • NON SPECIFIED Inogen    Requesting Portable O2 with conserving device  Requiring 3-5 L/nc 24/7    84% O2 sat on ambulation without O2  92% O2 sat with O2    Dx: R09.02, G47.33, J44.9 1 Each 11   • NON SPECIFIED Tresiba injector  "needles  31 guage, 1/4\" 30 Each 11   • ipratropium (ATROVENT) 17 MCG/ACT Aero Soln Inhale 2 Puffs by mouth every 6 hours.     • mupirocin (BACTROBAN) 2 % Ointment Apply  to affected area(s) every day.     • albuterol 108 (90 Base) MCG/ACT Aero Soln inhalation aerosol Inhale 2 Puffs by mouth every 6 hours as needed for Shortness of Breath.       No current facility-administered medications for this visit.          Allergies as of 09/26/2018 - Reviewed 09/26/2018   Allergen Reaction Noted   • Codeine  07/23/2018   • Latex  07/23/2018        ROS: As per HPI. Denies all other cardiopulmonary, GI, neurologic symptoms.    /66   Pulse 81   Temp 36.6 °C (97.9 °F) (Temporal)   Ht 1.727 m (5' 8\")   Wt (!) 190.5 kg (420 lb)   SpO2 (!) 76%   BMI 63.86 kg/m²     Physical Exam:  Gen:         Alert and oriented, No apparent distress. Morbidly obese  HEENT       No Lymphadenopathy or Bruits. Oropharynx clear without any erythema or exudate. Moist mucous membranes. Tympanic membranes are difficult to visualize via telemedicine and autoscope. No pain on tugging. Mild redness and auditory canal on the left. No drainage noted. No TTP across maxillary or frontal sinus.  Lungs:     Decreased breath sounds around, no wheezes or rhonchi heard.  CV:          Regular rate and rhythm. No murmurs, rubs or gallops.  Abd:         Soft non tender, non distended. Normal active bowel sounds.  No  Hepatosplenomegaly, No pulsatile masses.                   Ext:          No clubbing, cyanosis, edema.      Assessment and Plan.   58 y.o. female with the following issues.    1. Asthma with chronic obstructive pulmonary disease (COPD) ([facility])  Referral to pulmonology for further evaluation and treatment options    - REFERRAL TO PULMONOLOGY    2. HERIBERTO (obstructive sleep apnea)  Established with pulmonology as patient will need new CPAP machine, CPAP adjustment, possibly sleep study    - REFERRAL TO PULMONOLOGY    3. Type 2 diabetes mellitus " treated with insulin ([facility])  Will contact insurance carrier for alternative medications to Jiardiance    4. Acute serous otitis media of left ear, recurrence not specified  Treatment with Augmentin ×10 days  Ibuprofen when necessary  Antihistamines            Please note that this dictation was created using voice recognition software. I have made every reasonable attempt to correct obvious errors, but expect that there are errors of grammar and possible content that I did not discover before finalizing note.

## 2018-11-14 ENCOUNTER — TELEMEDICINE ORIGINATING SITE VISIT (OUTPATIENT)
Dept: MEDICAL GROUP | Facility: CLINIC | Age: 58
End: 2018-11-14
Payer: MEDICARE

## 2018-11-14 ENCOUNTER — NON-PROVIDER VISIT (OUTPATIENT)
Dept: MEDICAL GROUP | Facility: CLINIC | Age: 58
End: 2018-11-14
Payer: MEDICARE

## 2018-11-14 ENCOUNTER — TELEMEDICINE2 (OUTPATIENT)
Dept: MEDICAL GROUP | Age: 58
End: 2018-11-14
Payer: MEDICARE

## 2018-11-14 VITALS
SYSTOLIC BLOOD PRESSURE: 156 MMHG | DIASTOLIC BLOOD PRESSURE: 72 MMHG | TEMPERATURE: 99.1 F | HEART RATE: 95 BPM | WEIGHT: 293 LBS | RESPIRATION RATE: 20 BRPM | BODY MASS INDEX: 44.41 KG/M2 | OXYGEN SATURATION: 81 % | HEIGHT: 68 IN

## 2018-11-14 DIAGNOSIS — Z79.4 TYPE 2 DIABETES MELLITUS WITH RETINOPATHY OF RIGHT EYE, WITH LONG-TERM CURRENT USE OF INSULIN, MACULAR EDEMA PRESENCE UNSPECIFIED, UNSPECIFIED RETINOPATHY SEVERITY (HCC): ICD-10-CM

## 2018-11-14 DIAGNOSIS — H92.12 DRAINAGE FROM EAR, LEFT: ICD-10-CM

## 2018-11-14 DIAGNOSIS — E11.319 TYPE 2 DIABETES MELLITUS WITH RETINOPATHY OF RIGHT EYE, WITH LONG-TERM CURRENT USE OF INSULIN, MACULAR EDEMA PRESENCE UNSPECIFIED, UNSPECIFIED RETINOPATHY SEVERITY (HCC): ICD-10-CM

## 2018-11-14 DIAGNOSIS — E66.01 CLASS 3 SEVERE OBESITY DUE TO EXCESS CALORIES WITH SERIOUS COMORBIDITY AND BODY MASS INDEX (BMI) OF 60.0 TO 69.9 IN ADULT (HCC): ICD-10-CM

## 2018-11-14 DIAGNOSIS — R73.9 HYPERGLYCEMIA: ICD-10-CM

## 2018-11-14 DIAGNOSIS — J44.89 ASTHMA WITH CHRONIC OBSTRUCTIVE PULMONARY DISEASE (COPD): ICD-10-CM

## 2018-11-14 LAB
HBA1C MFR BLD: 6.8 % (ref ?–5.8)
INT CON NEG: NEGATIVE
INT CON POS: POSITIVE

## 2018-11-14 PROCEDURE — 99214 OFFICE O/P EST MOD 30 MIN: CPT | Performed by: INTERNAL MEDICINE

## 2018-11-14 PROCEDURE — 83036 HEMOGLOBIN GLYCOSYLATED A1C: CPT | Performed by: INTERNAL MEDICINE

## 2018-11-14 NOTE — NON-PROVIDER
Eddie Ricardo is a 58 y.o. female here for a non-provider visit for A1C    If abnormal was an in office provider notified today (if so, indicate provider)? Yes  Routed to PCP? Yes

## 2018-11-15 NOTE — PROGRESS NOTES
CC: 2-month follow-up visit    Verified identification with patient. Secured video conference with RN presenter in [facility]      HPI:   Eddie presents today with the following.    1. Asthma with chronic obstructive pulmonary disease (COPD) ([facility])  Patient here for follow-up of chest x-ray and pulmonary function tests.  Chest x-ray shows mild linear and poorly defined opacification in the left lower lung base which could be scarring or atelectasis or developing infiltrate. Clinically asymptomatic.  Patient using Atrovent and albuterol as needed.  Pulmonary function tests show severe obstruction  Patient will occasionally use oxygen at 4 L nasal cannula. Does not have a pulse ox for monitoring. Patient is still waiting to be eligible for her portable oxygen    Update 11/14/18: Patient will be receiving her portable oxygen and concentrator within the next couple of days.  She is using the company [employer].   Patient has not been seen by pulmonology yet.  Patient has completed her chest x-ray and pulmonary function testing.    2. 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 This medication has been extremely effective in lowering her A1c  Patient also treated with Victoza and Tresiba    Update 11/14/18: Patient has not changed her Jardiance yet.  Patient has had extra prescriptions and is still taking Jardiance along with Victoza and Tresiba.  Insurance will no longer cover Jardiance and will need an alternative medication.  Patient has decreased her metformin to 500 mg daily due to abdominal discomfort and bloating and symptoms have resolved.  She checks her blood sugars every few days.  Hemoglobin A1c today 6.8 up from 6.3.  Patient is also starting a new diet called [employer] to help with weight loss and managing her  "carbohydrate intake.    3. Drainage from ear, left  Patient completed a course of amoxicillin for some ear drainage since last visit.  Patient has ongoing occasional clear discharge from her ear with itching.  Patient has a history of psoriasis and was seen and evaluated by ENT in the past.  Patient was given eardrops which helped her symptoms.      Patient Active Problem List    Diagnosis Date Noted   • Drainage from ear, left 11/14/2018   • Asthma with chronic obstructive pulmonary disease (COPD) ([facility]) 09/12/2018   • Class 3 severe obesity due to excess calories with serious comorbidity and body mass index (BMI) of 60.0 to 69.9 in adult ([facility]) 09/12/2018   • Chronic bilateral low back pain with bilateral sciatica 07/24/2018   • Asthma in adult, mild persistent, uncomplicated 07/23/2018   • HERIBERTO (obstructive sleep apnea) 07/23/2018   • Type 2 diabetes mellitus with ophthalmic complication, with long-term current use of insulin ([facility]) 07/23/2018   • Dyslipidemia 07/23/2018   • Essential hypertension 07/23/2018   • Cellulitis due to MRSA 07/23/2018       Current Outpatient Prescriptions   Medication Sig Dispense Refill   • Canagliflozin 100 MG Tab Take 100 mg by mouth every day. 30 Tab 5   • Insulin Degludec (TRESIBA FLEXTOUCH) 100 UNIT/ML Solution Pen-injector Inject 69 Units as instructed every bedtime. 1 PEN 5   • amoxicillin-clavulanate (AUGMENTIN) 875-125 MG Tab Take 1 Tab by mouth 2 times a day. 10 Tab 0   • NON SPECIFIED Inogen    Requesting Portable O2 with conserving device  Requiring 3-5 L/nc 24/7    84% O2 sat on ambulation without O2  92% O2 sat with O2    Dx: R09.02, G47.33, J44.9 1 Each 11   • NON SPECIFIED Tresiba injector needles  31 guage, 1/4\" 30 Each 11   • liraglutide (VICTOZA) 18 MG/3ML Solution Pen-injector injection Inject 0.3 mL as instructed every day. 9 mL 5   • albuterol (PROVENTIL) 2.5mg/3ml Nebu Soln solution for nebulization 2.5 mg by Nebulization route every four hours as needed for " Shortness of Breath.     • amLODIPine (NORVASC) 10 MG Tab Take 10 mg by mouth every day.     • ipratropium (ATROVENT) 17 MCG/ACT Aero Soln Inhale 2 Puffs by mouth every 6 hours.     • furosemide (LASIX) 80 MG Tab Take 80 mg by mouth every day.     • gabapentin (NEURONTIN) 300 MG Cap Take 300 mg by mouth 3 times a day.     • ibuprofen (MOTRIN) 800 MG Tab Take 800 mg by mouth every 8 hours as needed.     • Empagliflozin (JARDIANCE) 10 MG Tab Take  by mouth.     • metformin (GLUCOPHAGE) 1000 MG tablet Take 1,000 mg by mouth 2 times a day, with meals.     • montelukast (SINGULAIR) 10 MG Tab Take 10 mg by mouth every day.     • mupirocin (BACTROBAN) 2 % Ointment Apply  to affected area(s) every day.     • omeprazole (PRILOSEC) 40 MG delayed-release capsule Take 40 mg by mouth every day.     • albuterol 108 (90 Base) MCG/ACT Aero Soln inhalation aerosol Inhale 2 Puffs by mouth every 6 hours as needed for Shortness of Breath.     • rosuvastatin (CRESTOR) 20 MG Tab Take 20 mg by mouth every evening.     • aspirin EC (ECOTRIN) 81 MG Tablet Delayed Response Take 81 mg by mouth every day.       No current facility-administered medications for this visit.          Allergies as of 11/14/2018 - Reviewed 11/14/2018   Allergen Reaction Noted   • Codeine  07/23/2018   • Latex  07/23/2018        ROS: As per HPI.  Denies all other cardiopulmonary, GI, neurologic symptoms.    There were no vitals taken for this visit.    Physical Exam:  Gen:         Alert and oriented, No apparent distress.  HEENT:        No Lymphadenopathy or Bruits.  Bilateral auditory canals with mild erythema/boggy.  No discharge noted.  Tympanic membranes intact.  Lungs:     Clear to auscultation bilaterally, no wheezes rhonchi or crackles  CV:          Regular rate and rhythm. No murmurs, rubs or gallops.  Abd:         Soft non tender, non distended. Normal active bowel sounds.  No  Hepatosplenomegaly, No pulsatile masses.                   Ext:          No  clubbing, cyanosis, edema.      Assessment and Plan.   58 y.o. female with the following issues.    1. Asthma with chronic obstructive pulmonary disease (COPD) ([facility])  We will check on status of pulmonary referral  Continue current plan of care    2. 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  Dispense: 30 Tab; Refill: 5    3. Drainage from ear, left  Patient will call with name of eardrops which helped symptoms in the past.            Please note that this dictation was created using voice recognition software. I have made every reasonable attempt to correct obvious errors, but expect that there are errors of grammar and possible content that I did not discover before finalizing note.

## 2019-01-10 DIAGNOSIS — K21.9 GASTROESOPHAGEAL REFLUX DISEASE WITHOUT ESOPHAGITIS: ICD-10-CM

## 2019-01-10 DIAGNOSIS — M25.50 MULTIPLE JOINT PAIN: ICD-10-CM

## 2019-01-10 DIAGNOSIS — Z91.09 ENVIRONMENTAL ALLERGIES: ICD-10-CM

## 2019-01-10 DIAGNOSIS — I10 ESSENTIAL HYPERTENSION: ICD-10-CM

## 2019-01-10 DIAGNOSIS — E11.21 CONTROLLED TYPE 2 DIABETES MELLITUS WITH DIABETIC NEPHROPATHY, WITHOUT LONG-TERM CURRENT USE OF INSULIN (HCC): ICD-10-CM

## 2019-01-10 DIAGNOSIS — G62.9 NEUROPATHY: ICD-10-CM

## 2019-01-10 RX ORDER — GABAPENTIN 300 MG/1
300 CAPSULE ORAL 3 TIMES DAILY
Qty: 90 CAP | Refills: 3 | Status: SHIPPED | OUTPATIENT
Start: 2019-01-10 | End: 2019-04-25 | Stop reason: SDUPTHER

## 2019-01-10 RX ORDER — IBUPROFEN 800 MG/1
800 TABLET ORAL EVERY 8 HOURS PRN
Qty: 30 TAB | Refills: 2 | Status: SHIPPED | OUTPATIENT
Start: 2019-01-10 | End: 2019-03-20 | Stop reason: SDUPTHER

## 2019-01-10 RX ORDER — AMLODIPINE BESYLATE 10 MG/1
10 TABLET ORAL DAILY
Qty: 30 TAB | Refills: 5 | Status: SHIPPED | OUTPATIENT
Start: 2019-01-10 | End: 2019-06-10 | Stop reason: SDUPTHER

## 2019-01-10 RX ORDER — FUROSEMIDE 80 MG
80 TABLET ORAL DAILY
Qty: 30 TAB | Refills: 5 | Status: SHIPPED | OUTPATIENT
Start: 2019-01-10 | End: 2019-06-18 | Stop reason: SDUPTHER

## 2019-01-10 RX ORDER — MONTELUKAST SODIUM 10 MG/1
10 TABLET ORAL DAILY
Qty: 30 TAB | Refills: 5 | Status: SHIPPED | OUTPATIENT
Start: 2019-01-10 | End: 2019-06-18 | Stop reason: SDUPTHER

## 2019-01-10 RX ORDER — OMEPRAZOLE 40 MG/1
40 CAPSULE, DELAYED RELEASE ORAL DAILY
Qty: 30 CAP | Refills: 5 | Status: SHIPPED | OUTPATIENT
Start: 2019-01-10 | End: 2019-02-22 | Stop reason: SDUPTHER

## 2019-01-10 NOTE — TELEPHONE ENCOUNTER
Was the patient seen in the last year in this department? Yes    Does patient have an active prescription for medications requested? Yes    Received Request Via: Pharmacy     Requested Prescriptions     Pending Prescriptions Disp Refills   • ibuprofen (MOTRIN) 800 MG Tab 30 Tab      Sig: Take 1 Tab by mouth every 8 hours as needed.   • gabapentin (NEURONTIN) 300 MG Cap 90 Cap      Sig: Take 1 Cap by mouth 3 times a day.   • amLODIPine (NORVASC) 10 MG Tab 30 Tab      Sig: Take 1 Tab by mouth every day.   • furosemide (LASIX) 80 MG Tab 30 Tab      Sig: Take 1 Tab by mouth every day.   • metformin (GLUCOPHAGE) 1000 MG tablet 60 Tab      Sig: Take 1 Tab by mouth 2 times a day, with meals.   • omeprazole (PRILOSEC) 40 MG delayed-release capsule 30 Cap      Sig: Take 1 Cap by mouth every day.   • montelukast (SINGULAIR) 10 MG Tab 30 Tab      Sig: Take 1 Tab by mouth every day.

## 2019-01-29 ENCOUNTER — OFFICE VISIT (OUTPATIENT)
Dept: MEDICAL GROUP | Facility: CLINIC | Age: 59
End: 2019-01-29
Payer: MEDICARE

## 2019-01-29 VITALS
HEART RATE: 83 BPM | DIASTOLIC BLOOD PRESSURE: 67 MMHG | HEIGHT: 68 IN | OXYGEN SATURATION: 90 % | SYSTOLIC BLOOD PRESSURE: 145 MMHG | BODY MASS INDEX: 44.41 KG/M2 | TEMPERATURE: 98.6 F | WEIGHT: 293 LBS | RESPIRATION RATE: 16 BRPM

## 2019-01-29 DIAGNOSIS — Z79.4 TYPE 2 DIABETES MELLITUS WITH RETINOPATHY OF RIGHT EYE, WITH LONG-TERM CURRENT USE OF INSULIN, MACULAR EDEMA PRESENCE UNSPECIFIED, UNSPECIFIED RETINOPATHY SEVERITY (HCC): ICD-10-CM

## 2019-01-29 DIAGNOSIS — D49.2 SKIN NEOPLASM: ICD-10-CM

## 2019-01-29 DIAGNOSIS — E11.319 TYPE 2 DIABETES MELLITUS WITH RETINOPATHY OF RIGHT EYE, WITH LONG-TERM CURRENT USE OF INSULIN, MACULAR EDEMA PRESENCE UNSPECIFIED, UNSPECIFIED RETINOPATHY SEVERITY (HCC): ICD-10-CM

## 2019-01-29 DIAGNOSIS — H92.12 DRAINAGE FROM EAR, LEFT: ICD-10-CM

## 2019-01-29 PROCEDURE — 99214 OFFICE O/P EST MOD 30 MIN: CPT | Performed by: PHYSICIAN ASSISTANT

## 2019-01-29 RX ORDER — PEN NEEDLE, DIABETIC 31 G X1/4"
NEEDLE, DISPOSABLE MISCELLANEOUS
COMMUNITY
Start: 2019-01-16 | End: 2019-03-29 | Stop reason: SDUPTHER

## 2019-01-29 RX ORDER — FLUOCINOLONE ACETONIDE 0.1 MG/ML
SOLUTION TOPICAL
Qty: 1 BOTTLE | Refills: 0 | Status: SHIPPED | OUTPATIENT
Start: 2019-01-29 | End: 2019-02-11

## 2019-01-29 NOTE — PROGRESS NOTES
"cc:  Growth on chest    Subjective:     Eddie Ricardo is a 58 y.o. female presenting for growth on chest      Patient states that she is still having drainage from left ear.  States it has been draining for a couple of months.  Nothing has helped.  She has a history of psoriasis in her ears. She does not remember the name of the ear drops.      Patient indicates that her invokana is not covered.  She is wanting an alternative.      Patient's main concern is a growth on her chest.  She states that it is increasing in size.  She has been \"picking\" at it.  She states that there has been pain itching.  She has had it for 3 months.      Ramirez is seeing Dr. Watters for her comorbitity issues.  She hsa a follow up in  March    Review of systems:  See above.       Current Outpatient Prescriptions:   •  fluocinolone acetonide (SYNALAR) 0.01 % external solution, Apply twice a day for 1-2 weeks., Disp: 1 Bottle, Rfl: 0  •  ibuprofen (MOTRIN) 800 MG Tab, Take 1 Tab by mouth every 8 hours as needed., Disp: 30 Tab, Rfl: 2  •  gabapentin (NEURONTIN) 300 MG Cap, Take 1 Cap by mouth 3 times a day., Disp: 90 Cap, Rfl: 3  •  amLODIPine (NORVASC) 10 MG Tab, Take 1 Tab by mouth every day., Disp: 30 Tab, Rfl: 5  •  furosemide (LASIX) 80 MG Tab, Take 1 Tab by mouth every day., Disp: 30 Tab, Rfl: 5  •  metformin (GLUCOPHAGE) 1000 MG tablet, Take 1 Tab by mouth 2 times a day, with meals., Disp: 60 Tab, Rfl: 5  •  omeprazole (PRILOSEC) 40 MG delayed-release capsule, Take 1 Cap by mouth every day., Disp: 30 Cap, Rfl: 5  •  montelukast (SINGULAIR) 10 MG Tab, Take 1 Tab by mouth every day., Disp: 30 Tab, Rfl: 5  •  Canagliflozin 100 MG Tab, Take 100 mg by mouth every day., Disp: 30 Tab, Rfl: 5  •  Insulin Degludec (TRESIBA FLEXTOUCH) 100 UNIT/ML Solution Pen-injector, Inject 69 Units as instructed every bedtime., Disp: 1 PEN, Rfl: 5  •  amoxicillin-clavulanate (AUGMENTIN) 875-125 MG Tab, Take 1 Tab by mouth 2 times a day., Disp: 10 Tab, Rfl: " "0  •  NON SPECIFIED, Inogen  Requesting Portable O2 with conserving device Requiring 3-5 L/nc 24/7  84% O2 sat on ambulation without O2 92% O2 sat with O2  Dx: R09.02, G47.33, J44.9, Disp: 1 Each, Rfl: 11  •  NON SPECIFIED, Tresiba injector needles 31 guage, 1/4\", Disp: 30 Each, Rfl: 11  •  liraglutide (VICTOZA) 18 MG/3ML Solution Pen-injector injection, Inject 0.3 mL as instructed every day., Disp: 9 mL, Rfl: 5  •  albuterol (PROVENTIL) 2.5mg/3ml Nebu Soln solution for nebulization, 2.5 mg by Nebulization route every four hours as needed for Shortness of Breath., Disp: , Rfl:   •  ipratropium (ATROVENT) 17 MCG/ACT Aero Soln, Inhale 2 Puffs by mouth every 6 hours., Disp: , Rfl:   •  Empagliflozin (JARDIANCE) 10 MG Tab, Take  by mouth., Disp: , Rfl:   •  mupirocin (BACTROBAN) 2 % Ointment, Apply  to affected area(s) every day., Disp: , Rfl:   •  albuterol 108 (90 Base) MCG/ACT Aero Soln inhalation aerosol, Inhale 2 Puffs by mouth every 6 hours as needed for Shortness of Breath., Disp: , Rfl:   •  rosuvastatin (CRESTOR) 20 MG Tab, Take 20 mg by mouth every evening., Disp: , Rfl:   •  aspirin EC (ECOTRIN) 81 MG Tablet Delayed Response, Take 81 mg by mouth every day., Disp: , Rfl:     Allergies, past medical history, past surgical history, family history, social history reviewed and updated    Objective:     Vitals: /67 (BP Location: Right arm, Patient Position: Sitting)   Pulse 83   Temp 37 °C (98.6 °F)   Resp 16   Ht 1.727 m (5' 8\")   Wt (!) 188.2 kg (415 lb)   SpO2 90%   BMI 63.10 kg/m²   General: Alert, pleasant, NAD  HEENT: Normocephalic.  EOMI, no icterus or pallor.  Conjunctivae and lids normal. External ears normal.  Psoriasis in external ear canals bilaterally.  Middle ear canals appear normal.  Oropharynx non-erythematous, mucous membranes moist.  Neck supple.  Respiratory: Patient on O2.  Skin: Warm, dry, no rashes.  Raised skin lesion left chest warty in appearance excoriated.  Musculoskeletal: " Gait is normal.  Moves all extremities well.  Neuro:  cranial nerves II through XII intact.  No focal deficits noted per  Psych:  Affect/mood is normal, judgement is good, memory is intact, grooming is appropriate.    Assessment/Plan:     Eddie was seen today for other.    Diagnoses and all orders for this visit:    Skin neoplasm  Warty in appearance.  Believe cryosurgery would be helpful.  However we do not have nitrogen in the office at this time.  We are working on this.  Offered patient a dermatology referral but she would prefer to wait until we are able to do cryosurgery here.  She has an appointment March 14 to follow-up and if possible we will do it then.    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  There have been insurance issues with medication coverage.  We will obtain labs to see how well she does without the medication.  However she will also research with her insurance and the pharmacy what is covered so that we may be able to replace the Giardia and's.  She is unable to take Invokana.    Drainage from ear, left  -     fluocinolone acetonide (SYNALAR) 0.01 % external solution; Apply twice a day for 1-2 weeks.  No improvement, patient to contact clinic.            No Follow-up on file.

## 2019-02-11 ENCOUNTER — OFFICE VISIT (OUTPATIENT)
Dept: MEDICAL GROUP | Facility: CLINIC | Age: 59
End: 2019-02-11
Payer: MEDICARE

## 2019-02-11 VITALS
BODY MASS INDEX: 44.41 KG/M2 | DIASTOLIC BLOOD PRESSURE: 60 MMHG | HEIGHT: 68 IN | WEIGHT: 293 LBS | HEART RATE: 79 BPM | TEMPERATURE: 98 F | SYSTOLIC BLOOD PRESSURE: 142 MMHG | OXYGEN SATURATION: 90 %

## 2019-02-11 DIAGNOSIS — E11.319 TYPE 2 DIABETES MELLITUS WITH RETINOPATHY OF RIGHT EYE, WITH LONG-TERM CURRENT USE OF INSULIN, MACULAR EDEMA PRESENCE UNSPECIFIED, UNSPECIFIED RETINOPATHY SEVERITY (HCC): ICD-10-CM

## 2019-02-11 DIAGNOSIS — J45.30 ASTHMA IN ADULT, MILD PERSISTENT, UNCOMPLICATED: ICD-10-CM

## 2019-02-11 DIAGNOSIS — J44.89 ASTHMA WITH CHRONIC OBSTRUCTIVE PULMONARY DISEASE (COPD): ICD-10-CM

## 2019-02-11 DIAGNOSIS — Z79.4 TYPE 2 DIABETES MELLITUS WITH RETINOPATHY OF RIGHT EYE, WITH LONG-TERM CURRENT USE OF INSULIN, MACULAR EDEMA PRESENCE UNSPECIFIED, UNSPECIFIED RETINOPATHY SEVERITY (HCC): ICD-10-CM

## 2019-02-11 DIAGNOSIS — J01.01 ACUTE RECURRENT MAXILLARY SINUSITIS: ICD-10-CM

## 2019-02-11 DIAGNOSIS — J40 BRONCHITIS: ICD-10-CM

## 2019-02-11 DIAGNOSIS — E78.5 DYSLIPIDEMIA: ICD-10-CM

## 2019-02-11 DIAGNOSIS — I10 ESSENTIAL HYPERTENSION: ICD-10-CM

## 2019-02-11 DIAGNOSIS — J01.11 ACUTE RECURRENT FRONTAL SINUSITIS: ICD-10-CM

## 2019-02-11 PROCEDURE — 99214 OFFICE O/P EST MOD 30 MIN: CPT | Performed by: PHYSICIAN ASSISTANT

## 2019-02-11 RX ORDER — MECLIZINE HYDROCHLORIDE 25 MG/1
TABLET ORAL
COMMUNITY
Start: 2019-02-04 | End: 2019-12-03

## 2019-02-11 RX ORDER — AMOXICILLIN AND CLAVULANATE POTASSIUM 875; 125 MG/1; MG/1
1 TABLET, FILM COATED ORAL 2 TIMES DAILY
Qty: 20 TAB | Refills: 0 | Status: SHIPPED | OUTPATIENT
Start: 2019-02-11 | End: 2019-09-09

## 2019-02-11 RX ORDER — FLUTICASONE PROPIONATE 50 MCG
1 SPRAY, SUSPENSION (ML) NASAL DAILY
Qty: 16 G | Refills: 6 | Status: SHIPPED | OUTPATIENT
Start: 2019-02-11 | End: 2019-09-16 | Stop reason: SDUPTHER

## 2019-02-11 RX ORDER — ONDANSETRON 4 MG/1
TABLET, FILM COATED ORAL
COMMUNITY
Start: 2019-02-04 | End: 2019-12-03

## 2019-02-11 RX ORDER — ALBUTEROL SULFATE 2.5 MG/3ML
2.5 SOLUTION RESPIRATORY (INHALATION) EVERY 4 HOURS PRN
Qty: 300 BULLET | Refills: 6 | Status: SHIPPED | OUTPATIENT
Start: 2019-02-11

## 2019-02-11 NOTE — PROGRESS NOTES
cc:  congestion    Subjective:     Eddie Ricardo is a 58 y.o. female presenting for congestion       Patient states that she has been having congestion and headaches that go to the back of her head.  She states that she has had chest congestion and burning.  She was seen in [facility] ER.  She has been having vertigo.  She states she still has medication.  She states that she was given meclizine at the [facility].  She states she still has medication.  She is here as her sinuses, chest congestion and headaches are still bad.  She has used flonase in the past, but has not had it since moving here.  She denies fever and chills and nausea.  She denies any other symptoms.  She feels it could be related to her cpap machine.    Review of systems:  See above.  Denies any other issues unless previously indicated.        Current Outpatient Prescriptions:   •  meclizine (ANTIVERT) 25 MG Tab, , Disp: , Rfl:   •  ondansetron (ZOFRAN) 4 MG Tab tablet, , Disp: , Rfl:   •  ipratropium (ATROVENT) 17 MCG/ACT Aero Soln, Inhale 2 Puffs by mouth every 6 hours., Disp: , Rfl:   •  ipratropium (ATROVENT) 0.02 % Solution, 1 mL by Nebulization route 4 times a day., Disp: 300 Bullet, Rfl: 6  •  albuterol (PROVENTIL) 2.5mg/3ml Nebu Soln solution for nebulization, 3 mL by Nebulization route every four hours as needed for Shortness of Breath., Disp: 300 Bullet, Rfl: 6  •  amoxicillin-clavulanate (AUGMENTIN) 875-125 MG Tab, Take 1 Tab by mouth 2 times a day., Disp: 20 Tab, Rfl: 0  •  fluticasone (FLONASE ALLERGY RELIEF) 50 MCG/ACT nasal spray, Spray 1 Spray in nose every day., Disp: 16 g, Rfl: 6  •  ibuprofen (MOTRIN) 800 MG Tab, Take 1 Tab by mouth every 8 hours as needed., Disp: 30 Tab, Rfl: 2  •  gabapentin (NEURONTIN) 300 MG Cap, Take 1 Cap by mouth 3 times a day., Disp: 90 Cap, Rfl: 3  •  amLODIPine (NORVASC) 10 MG Tab, Take 1 Tab by mouth every day., Disp: 30 Tab, Rfl: 5  •  furosemide (LASIX) 80 MG Tab, Take 1 Tab by mouth every day., Disp:  "30 Tab, Rfl: 5  •  metformin (GLUCOPHAGE) 1000 MG tablet, Take 1 Tab by mouth 2 times a day, with meals., Disp: 60 Tab, Rfl: 5  •  omeprazole (PRILOSEC) 40 MG delayed-release capsule, Take 1 Cap by mouth every day., Disp: 30 Cap, Rfl: 5  •  montelukast (SINGULAIR) 10 MG Tab, Take 1 Tab by mouth every day., Disp: 30 Tab, Rfl: 5  •  Canagliflozin 100 MG Tab, Take 100 mg by mouth every day., Disp: 30 Tab, Rfl: 5  •  Insulin Degludec (TRESIBA FLEXTOUCH) 100 UNIT/ML Solution Pen-injector, Inject 69 Units as instructed every bedtime., Disp: 1 PEN, Rfl: 5  •  liraglutide (VICTOZA) 18 MG/3ML Solution Pen-injector injection, Inject 0.3 mL as instructed every day., Disp: 9 mL, Rfl: 5  •  mupirocin (BACTROBAN) 2 % Ointment, Apply  to affected area(s) every day., Disp: , Rfl:   •  albuterol 108 (90 Base) MCG/ACT Aero Soln inhalation aerosol, Inhale 2 Puffs by mouth every 6 hours as needed for Shortness of Breath., Disp: , Rfl:   •  rosuvastatin (CRESTOR) 20 MG Tab, Take 20 mg by mouth every evening., Disp: , Rfl:   •  aspirin EC (ECOTRIN) 81 MG Tablet Delayed Response, Take 81 mg by mouth every day., Disp: , Rfl:   •  EASY TOUCH PEN NEEDLES 31G X 6 MM Misc, , Disp: , Rfl:   •  NON SPECIFIED, Inogen  Requesting Portable O2 with conserving device Requiring 3-5 L/nc 24/7  84% O2 sat on ambulation without O2 92% O2 sat with O2  Dx: R09.02, G47.33, J44.9, Disp: 1 Each, Rfl: 11  •  NON SPECIFIED, Tresiba injector needles 31 guage, 1/4\", Disp: 30 Each, Rfl: 11  •  Empagliflozin (JARDIANCE) 10 MG Tab, Take  by mouth., Disp: , Rfl:     Allergies, past medical history, past surgical history, family history, social history reviewed and updated    Objective:     Vitals: /60 (BP Location: Right arm, Patient Position: Sitting)   Pulse 79   Temp 36.7 °C (98 °F) (Tympanic)   Ht 1.727 m (5' 8\")   Wt (!) 190.5 kg (420 lb)   SpO2 90%   BMI 63.86 kg/m²   General: Alert, pleasant, NAD  HEENT: Normocephalic.  EOMI, no icterus or pallor.  " Conjunctivae and lids normal. External ears normal.Internal ear canals normal.  Pain upon palpation frontal and maxillary sinuses.  Oropharynx non-erythematous, mucous membranes moist.  Neck supple.  No cervical or supraclavicular lymphadenopathy.  Heart: Regular rate and rhythm.  S1 and S2 normal.  No murmurs appreciated.  Respiratory: decreased to absent breath sounds all lung fields.  Patient on O2  Abdomen: obese  Skin: Warm, dry, no rashes.  Musculoskeletal: Gait is normal.  Moves all extremities well.  Neuro: Cranial nerves II through XII intact.  No focal deficits noted.  Psych:  Affect/mood is normal, judgement is good, memory is intact, grooming is appropriate.    Assessment/Plan:     Eddie was seen today for other.    Diagnoses and all orders for this visit:    Asthma in adult, mild persistent, uncomplicated  -     ipratropium (ATROVENT) 0.02 % Solution; 1 mL by Nebulization route 4 times a day.  -     albuterol (PROVENTIL) 2.5mg/3ml Nebu Soln solution for nebulization; 3 mL by Nebulization route every four hours as needed for Shortness of Breath.  Asthma with chronic obstructive pulmonary disease (COPD) ([facility])  -     ipratropium (ATROVENT) 0.02 % Solution; 1 mL by Nebulization route 4 times a day.  -     albuterol (PROVENTIL) 2.5mg/3ml Nebu Soln solution for nebulization; 3 mL by Nebulization route every four hours as needed for Shortness of Breath.  Acute recurrent frontal sinusitis  -     amoxicillin-clavulanate (AUGMENTIN) 875-125 MG Tab; Take 1 Tab by mouth 2 times a day.  -     fluticasone (FLONASE ALLERGY RELIEF) 50 MCG/ACT nasal spray; Spray 1 Spray in nose every day.  Acute recurrent maxillary sinusitis  -     amoxicillin-clavulanate (AUGMENTIN) 875-125 MG Tab; Take 1 Tab by mouth 2 times a day.  -     fluticasone (FLONASE ALLERGY RELIEF) 50 MCG/ACT nasal spray; Spray 1 Spray in nose every day.  Bronchitis  -     amoxicillin-clavulanate (AUGMENTIN) 875-125 MG Tab; Take 1 Tab by mouth 2 times a  day.  We will refill nebulizer medications as patient has been out for months.  Will start Augmentin and start her on Flonase.  Continue with Antivert as needed.  She has a follow-up appointment coming up in March but if no improvement in the next several weeks, we can see her sooner.    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  She will be having these drawn about a week or so before her next appointment.        No Follow-up on file.

## 2019-02-19 DIAGNOSIS — E78.1 HYPERTRIGLYCERIDEMIA: ICD-10-CM

## 2019-02-19 DIAGNOSIS — G47.33 OSA (OBSTRUCTIVE SLEEP APNEA): ICD-10-CM

## 2019-02-19 DIAGNOSIS — J44.89 ASTHMA WITH CHRONIC OBSTRUCTIVE PULMONARY DISEASE (COPD): ICD-10-CM

## 2019-02-19 DIAGNOSIS — R09.02 HYPOXIA: ICD-10-CM

## 2019-02-19 DIAGNOSIS — K21.9 GASTROESOPHAGEAL REFLUX DISEASE WITHOUT ESOPHAGITIS: ICD-10-CM

## 2019-02-20 RX ORDER — OMEGA-3-ACID ETHYL ESTERS 1 G/1
2 CAPSULE, LIQUID FILLED ORAL 2 TIMES DAILY
Qty: 360 CAP | Refills: 2 | Status: SHIPPED | OUTPATIENT
Start: 2019-02-20 | End: 2019-02-22 | Stop reason: SDUPTHER

## 2019-02-22 RX ORDER — OMEGA-3-ACID ETHYL ESTERS 1 G/1
2 CAPSULE, LIQUID FILLED ORAL 2 TIMES DAILY
Qty: 360 CAP | Refills: 2 | Status: SHIPPED | OUTPATIENT
Start: 2019-02-22 | End: 2019-05-23

## 2019-02-22 RX ORDER — OMEPRAZOLE 40 MG/1
40 CAPSULE, DELAYED RELEASE ORAL DAILY
Qty: 30 CAP | Refills: 5 | Status: SHIPPED | OUTPATIENT
Start: 2019-02-22 | End: 2019-06-18 | Stop reason: SDUPTHER

## 2019-03-06 ENCOUNTER — NON-PROVIDER VISIT (OUTPATIENT)
Dept: MEDICAL GROUP | Facility: CLINIC | Age: 59
End: 2019-03-06
Payer: MEDICARE

## 2019-03-06 DIAGNOSIS — E78.5 DYSLIPIDEMIA: ICD-10-CM

## 2019-03-06 DIAGNOSIS — I10 ESSENTIAL HYPERTENSION: ICD-10-CM

## 2019-03-06 PROCEDURE — 36415 COLL VENOUS BLD VENIPUNCTURE: CPT | Performed by: INTERNAL MEDICINE

## 2019-03-06 NOTE — NON-PROVIDER
Eddie Ricardo is a 58 y.o. female here for a non-provider visit for venipuncture.    If abnormal was an in office provider notified today (if so, indicate provider)? Yes  Routed to PCP? Yes

## 2019-03-11 ENCOUNTER — TELEPHONE (OUTPATIENT)
Dept: MEDICAL GROUP | Facility: CLINIC | Age: 59
End: 2019-03-11

## 2019-03-11 NOTE — TELEPHONE ENCOUNTER
Pt requesting rx for diflucan for yeast infection due to antibiotics and invokana. Please send to [facility]. 3/11/19

## 2019-03-13 DIAGNOSIS — B37.9 YEAST INFECTION: ICD-10-CM

## 2019-03-13 RX ORDER — FLUCONAZOLE 150 MG/1
150 TABLET ORAL DAILY
Qty: 1 TAB | Refills: 0 | Status: SHIPPED | OUTPATIENT
Start: 2019-03-13 | End: 2019-12-03

## 2019-03-20 ENCOUNTER — TELEMEDICINE2 (OUTPATIENT)
Dept: MEDICAL GROUP | Age: 59
End: 2019-03-20
Payer: MEDICARE

## 2019-03-20 ENCOUNTER — TELEMEDICINE ORIGINATING SITE VISIT (OUTPATIENT)
Dept: MEDICAL GROUP | Facility: CLINIC | Age: 59
End: 2019-03-20
Payer: MEDICARE

## 2019-03-20 VITALS
SYSTOLIC BLOOD PRESSURE: 162 MMHG | HEART RATE: 82 BPM | BODY MASS INDEX: 44.41 KG/M2 | DIASTOLIC BLOOD PRESSURE: 58 MMHG | TEMPERATURE: 97.3 F | HEIGHT: 68 IN | OXYGEN SATURATION: 84 % | WEIGHT: 293 LBS

## 2019-03-20 DIAGNOSIS — J44.89 ASTHMA WITH CHRONIC OBSTRUCTIVE PULMONARY DISEASE (COPD): ICD-10-CM

## 2019-03-20 DIAGNOSIS — M25.50 MULTIPLE JOINT PAIN: ICD-10-CM

## 2019-03-20 DIAGNOSIS — G47.33 OSA (OBSTRUCTIVE SLEEP APNEA): ICD-10-CM

## 2019-03-20 DIAGNOSIS — E66.01 CLASS 3 SEVERE OBESITY DUE TO EXCESS CALORIES WITH SERIOUS COMORBIDITY AND BODY MASS INDEX (BMI) OF 60.0 TO 69.9 IN ADULT (HCC): ICD-10-CM

## 2019-03-20 PROCEDURE — 99214 OFFICE O/P EST MOD 30 MIN: CPT | Performed by: INTERNAL MEDICINE

## 2019-03-20 RX ORDER — IBUPROFEN 800 MG/1
800 TABLET ORAL EVERY 8 HOURS PRN
Qty: 30 TAB | Refills: 1 | Status: SHIPPED | OUTPATIENT
Start: 2019-03-20 | End: 2019-04-05 | Stop reason: SDUPTHER

## 2019-03-20 NOTE — TELEPHONE ENCOUNTER
Was the patient seen in the last year in this department? Yes    Does patient have an active prescription for medications requested? Yes    Received Request Via: Pharmacy     Requested Prescriptions     Pending Prescriptions Disp Refills   • liraglutide (VICTOZA) 18 MG/3ML Solution Pen-injector injection 9 mL 5     Sig: Inject 0.3 mL as instructed every day.

## 2019-03-20 NOTE — TELEPHONE ENCOUNTER
Was the patient seen in the last year in this department? Yes    Does patient have an active prescription for medications requested? Yes    Received Request Via: Pharmacy       Requested Prescriptions     Pending Prescriptions Disp Refills   • ibuprofen (MOTRIN) 800 MG Tab 30 Tab 2     Sig: Take 1 Tab by mouth every 8 hours as needed.

## 2019-03-29 DIAGNOSIS — Z79.4 TYPE 2 DIABETES MELLITUS WITH HYPEROSMOLARITY WITHOUT COMA, WITH LONG-TERM CURRENT USE OF INSULIN (HCC): ICD-10-CM

## 2019-03-29 DIAGNOSIS — E11.00 TYPE 2 DIABETES MELLITUS WITH HYPEROSMOLARITY WITHOUT COMA, WITH LONG-TERM CURRENT USE OF INSULIN (HCC): ICD-10-CM

## 2019-03-29 NOTE — TELEPHONE ENCOUNTER
Was the patient seen in the last year in this department? Yes    Does patient have an active prescription for medications requested? Yes    Received Request Via: Patient       Requested Prescriptions     Pending Prescriptions Disp Refills   • EASY TOUCH PEN NEEDLES 31G X 6 MM Misc

## 2019-04-01 ENCOUNTER — TELEPHONE (OUTPATIENT)
Dept: MEDICAL GROUP | Facility: CLINIC | Age: 59
End: 2019-04-01

## 2019-04-01 RX ORDER — PEN NEEDLE, DIABETIC 31 G X1/4"
NEEDLE, DISPOSABLE MISCELLANEOUS
Qty: 100 EACH | Refills: 2 | Status: SHIPPED | OUTPATIENT
Start: 2019-04-01 | End: 2019-08-26 | Stop reason: SDUPTHER

## 2019-04-01 NOTE — PROGRESS NOTES
"CC: 4-month follow-up visit.    Verified identification with patient. Secured video conference with RN presenter in [facility]      HPI:   Eddie presents today with the following.    1. Asthma with chronic obstructive pulmonary disease (COPD) ([facility])  Patient is here as one month follow-up visit.  Patient states that she was seen in urgent care 2 times and at the [place] ER for asthma exacerbation.  Patient completed a course of Augmentin in the middle of February.  Patient is O2 dependent, did not bring her oxygen today.  She has a portable concentrator.  Patient is now stable.    2. HERIBERTO (obstructive sleep apnea)  Patient requesting referral to pulmonology as she needs a CPAP which was initially provided to her in 2006.  Patient has not had an adjustment in her CPAP in over 5 years.    3. Class 3 severe obesity due to excess calories with serious comorbidity and body mass index (BMI) of 60.0 to 69.9 in adult ([facility])  Patient is 414 pounds with a current BMI greater than 64.    4. Diabetes mellitus, insulin dependent (IDDM), controlled ([facility])  Patient is currently treated with Jacinda but and Victoza and has decreased her metformin to 1000 mg once daily from twice daily as she feels \"funny\".  Jardiance was not covered and Invokana caused severe yeast infections.  Current hemoglobin A1c 7.2.  Patient also forgets to check her blood sugars regularly.  Total cholesterol 170, triglyceride 173.  CMP within normal limits.  Urine microalbumin within normal limits.      Patient Active Problem List    Diagnosis Date Noted   • Diabetes mellitus, insulin dependent (IDDM), controlled ([facility]) 03/20/2019   • Skin neoplasm 01/29/2019   • Drainage from ear, left 11/14/2018   • Asthma with chronic obstructive pulmonary disease (COPD) ([facility]) 09/12/2018   • Class 3 severe obesity due to excess calories with serious comorbidity and body mass index (BMI) of 60.0 to 69.9 in adult ([facility]) 09/12/2018   • Chronic bilateral low back pain with " bilateral sciatica 07/24/2018   • Asthma in adult, mild persistent, uncomplicated 07/23/2018   • HERIBERTO (obstructive sleep apnea) 07/23/2018   • Type 2 diabetes mellitus with ophthalmic complication, with long-term current use of insulin (HCC) 07/23/2018   • Dyslipidemia 07/23/2018   • Essential hypertension 07/23/2018   • Cellulitis due to MRSA 07/23/2018       Current Outpatient Prescriptions   Medication Sig Dispense Refill   • ibuprofen (MOTRIN) 800 MG Tab Take 1 Tab by mouth every 8 hours as needed. 30 Tab 1   • liraglutide (VICTOZA) 18 MG/3ML Solution Pen-injector injection Inject 0.3 mL as instructed every day. 9 mL 5   • omeprazole (PRILOSEC) 40 MG delayed-release capsule Take 1 Cap by mouth every day. 30 Cap 5   • gabapentin (NEURONTIN) 300 MG Cap Take 1 Cap by mouth 3 times a day. 90 Cap 3   • amLODIPine (NORVASC) 10 MG Tab Take 1 Tab by mouth every day. 30 Tab 5   • furosemide (LASIX) 80 MG Tab Take 1 Tab by mouth every day. 30 Tab 5   • metformin (GLUCOPHAGE) 1000 MG tablet Take 1 Tab by mouth 2 times a day, with meals. 60 Tab 5   • montelukast (SINGULAIR) 10 MG Tab Take 1 Tab by mouth every day. 30 Tab 5   • Insulin Degludec (TRESIBA FLEXTOUCH) 100 UNIT/ML Solution Pen-injector Inject 69 Units as instructed every bedtime. 1 PEN 5   • rosuvastatin (CRESTOR) 20 MG Tab Take 20 mg by mouth every evening.     • aspirin EC (ECOTRIN) 81 MG Tablet Delayed Response Take 81 mg by mouth every day.     • PHARMACIST CHOICE ALCOHOL 70 % Pads      • fluconazole (DIFLUCAN) 150 MG tablet Take 1 Tab by mouth every day. (Patient not taking: Reported on 3/20/2019) 1 Tab 0   • omega-3 acid ethyl esters (LOVAZA) 1 GM capsule Take 2 Caps by mouth 2 Times a Day for 90 days. 360 Cap 2   • meclizine (ANTIVERT) 25 MG Tab      • ondansetron (ZOFRAN) 4 MG Tab tablet      • ipratropium (ATROVENT) 17 MCG/ACT Aero Soln Inhale 2 Puffs by mouth every 6 hours.     • ipratropium (ATROVENT) 0.02 % Solution 1 mL by Nebulization route 4 times a  "day. 300 Bullet 6   • albuterol (PROVENTIL) 2.5mg/3ml Nebu Soln solution for nebulization 3 mL by Nebulization route every four hours as needed for Shortness of Breath. 300 Bullet 6   • amoxicillin-clavulanate (AUGMENTIN) 875-125 MG Tab Take 1 Tab by mouth 2 times a day. (Patient not taking: Reported on 3/20/2019) 20 Tab 0   • fluticasone (FLONASE ALLERGY RELIEF) 50 MCG/ACT nasal spray Spray 1 Spray in nose every day. 16 g 6   • EASY TOUCH PEN NEEDLES 31G X 6 MM Misc      • Canagliflozin 100 MG Tab Take 100 mg by mouth every day. 30 Tab 5   • NON SPECIFIED Inogen    Requesting Portable O2 with conserving device  Requiring 3-5 L/nc 24/7    84% O2 sat on ambulation without O2  92% O2 sat with O2    Dx: R09.02, G47.33, J44.9 1 Each 11   • NON SPECIFIED Tresiba injector needles  31 guage, 1/4\" 30 Each 11   • Empagliflozin (JARDIANCE) 10 MG Tab Take  by mouth.     • mupirocin (BACTROBAN) 2 % Ointment Apply  to affected area(s) every day.     • albuterol 108 (90 Base) MCG/ACT Aero Soln inhalation aerosol Inhale 2 Puffs by mouth every 6 hours as needed for Shortness of Breath.       No current facility-administered medications for this visit.          Allergies as of 03/20/2019 - Reviewed 03/20/2019   Allergen Reaction Noted   • Codeine  07/23/2018   • Latex  07/23/2018        ROS: As per HPI.  Denies all cardiopulmonary, GI, neurologic symptoms at this time.    BP (!) 162/58 (BP Location: Left arm, Patient Position: Sitting)   Pulse 82   Temp 36.3 °C (97.3 °F) (Temporal)   Ht 1.727 m (5' 8\")   Wt (!) 187.8 kg (414 lb)   SpO2 (!) 84%   BMI 62.95 kg/m²     Physical Exam:  Gen:         Alert and oriented, No apparent distress.  Neck:        No Lymphadenopathy or Bruits.  No thyromegaly.  Neck is supple.  Lungs:     Decreased breath sounds throughout, no wheezes rhonchi or crackles  CV:          Regular rate and rhythm. No murmurs, rubs or gallops.  Abd:         Soft non tender, non distended. Normal active bowel " sounds.  No  Hepatosplenomegaly, No pulsatile masses.                   Ext:          No clubbing, cyanosis, edema.  Microfilament test.  Sensations intact lower extremity bilaterally    Assessment and Plan.   58 y.o. female with the following issues.    1. Asthma with chronic obstructive pulmonary disease (COPD) ([facility])  Repeat O2 91% on room air.  Asthma stable, continue current plan of care.  Treat allergies  Pulmonology referral per patient request  Chest x-ray and PFTs completed    2. HERIBERTO (obstructive sleep apnea)  Referral to pulmonology for CPAP evaluation    3. Class 3 severe obesity due to excess calories with serious comorbidity and body mass index (BMI) of 60.0 to 69.9 in adult ([facility])  Briefly discussed diet and lifestyle options.  Patient will try online weight watchers as this has worked in the past.  Discussed the importance of portion control, low carbohydrate intake.    4. Diabetes mellitus, insulin dependent (IDDM), controlled ([facility])  At this time continue Tresiba and Victoza at current dose  Patient is to check blood sugars 3 times daily, bring in blood sugar log and adjust medications with alternatives  Follow-up with diabetes management in 3 to 4 weeks.          Please note that this dictation was created using voice recognition software. I have made every reasonable attempt to correct obvious errors, but expect that there are errors of grammar and possible content that I did not discover before finalizing note.

## 2019-04-01 NOTE — TELEPHONE ENCOUNTER
Please send a referral to pulmonology so pt can be set up to have cpap machine and supplies managed. 4/1/19

## 2019-04-05 DIAGNOSIS — M25.50 MULTIPLE JOINT PAIN: ICD-10-CM

## 2019-04-05 RX ORDER — IBUPROFEN 800 MG/1
800 TABLET ORAL EVERY 8 HOURS PRN
Qty: 30 TAB | Refills: 1 | Status: SHIPPED | OUTPATIENT
Start: 2019-04-05 | End: 2019-04-19 | Stop reason: SDUPTHER

## 2019-04-05 NOTE — TELEPHONE ENCOUNTER
Was the patient seen in the last year in this department? Yes    Does patient have an active prescription for medications requested? Yes    Received Request Via: Pharmacy     Requested Prescriptions     Pending Prescriptions Disp Refills   • ibuprofen (MOTRIN) 800 MG Tab 30 Tab 1     Sig: Take 1 Tab by mouth every 8 hours as needed.

## 2019-04-19 DIAGNOSIS — M25.50 MULTIPLE JOINT PAIN: ICD-10-CM

## 2019-04-19 RX ORDER — IBUPROFEN 800 MG/1
800 TABLET ORAL EVERY 8 HOURS PRN
Qty: 30 TAB | Refills: 1 | Status: SHIPPED | OUTPATIENT
Start: 2019-04-19 | End: 2019-06-18 | Stop reason: SDUPTHER

## 2019-04-22 ENCOUNTER — TELEMEDICINE2 (OUTPATIENT)
Dept: PULMONOLOGY | Facility: HOSPICE | Age: 59
End: 2019-04-22
Payer: MEDICARE

## 2019-04-22 ENCOUNTER — TELEMEDICINE ORIGINATING SITE VISIT (OUTPATIENT)
Dept: MEDICAL GROUP | Facility: CLINIC | Age: 59
End: 2019-04-22
Payer: MEDICARE

## 2019-04-22 VITALS
BODY MASS INDEX: 44.41 KG/M2 | HEIGHT: 68 IN | HEART RATE: 89 BPM | SYSTOLIC BLOOD PRESSURE: 161 MMHG | WEIGHT: 293 LBS | TEMPERATURE: 97.3 F | DIASTOLIC BLOOD PRESSURE: 72 MMHG | RESPIRATION RATE: 16 BRPM | OXYGEN SATURATION: 90 %

## 2019-04-22 DIAGNOSIS — J44.9 CHRONIC OBSTRUCTIVE PULMONARY DISEASE, UNSPECIFIED COPD TYPE (HCC): ICD-10-CM

## 2019-04-22 DIAGNOSIS — R09.02 HYPOXEMIA: ICD-10-CM

## 2019-04-22 DIAGNOSIS — R63.8 INCREASED BMI: ICD-10-CM

## 2019-04-22 DIAGNOSIS — G47.33 OSA (OBSTRUCTIVE SLEEP APNEA): ICD-10-CM

## 2019-04-22 DIAGNOSIS — J44.89 ASTHMA WITH CHRONIC OBSTRUCTIVE PULMONARY DISEASE (COPD): ICD-10-CM

## 2019-04-22 DIAGNOSIS — R06.02 SOB (SHORTNESS OF BREATH): ICD-10-CM

## 2019-04-22 PROCEDURE — 99203 OFFICE O/P NEW LOW 30 MIN: CPT | Performed by: INTERNAL MEDICINE

## 2019-04-22 ASSESSMENT — PAIN SCALES - GENERAL: PAINLEVEL: NO PAIN

## 2019-04-22 NOTE — LETTER
Raoul Billingsley M.D.  [facility]   [address] [place] [address] [place] [address]  Phone: [phone] - Fax: [phone]           Encounter Date: 4/22/2019  Provider: Raoul Billingsley M.D.  Location of Care: [facility] PULMONARY MEDICINE      Patient:   Eddie Ricardo   MR Number: [id number]   YOB: 1960     PROGRESS NOTE:  No chief complaint on file.      HPI:  The patient is a 58-year-old woman who has a history of asthma.  She is a former smoker.  She quit smoking in 2006.  She previously smoked 2 to 3 packs of cigarettes a day for 25 to 30 years.  Most of her problems have occurred after 2006 when she was hospitalized in [facility] with what sounds like MRSA pneumonia.  At that time she required intubation and mechanical ventilation.  She had a tracheostomy.  We do not have records of that hospitalization.  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  In 2006 she was started on BiPAP.  She was also started on supplemental oxygen and uses oxygen at night in addition to her BiPAP at 3 to 4 L/min.  She does have a portable oxygen concentrator.  Her medications include albuterol HFA Singulair and Atrovent HFA.  She moved to [place] from [facility] about a year and a half ago.  She was seen in the emergency room in [facility] because of an upper respiratory infection and associated vertigo.  She did not require additional treatment for her asthma at that time.  Other medical problems include her weight of 416 pounds and diabetes mellitus.  She is trying to lose weight.  She has not had any increase in her chronic dyspnea.  She has a chronic cough for the past 13 years.  She occasionally brings up discolored sputum.  She wheezes on occasion and has occasional chest tightness with shortness of breath.  She says she  cannot walk very far without becoming short of breath.    Past Medical History:   Diagnosis Date   • Asthma in adult, mild persistent, uncomplicated 7/23/2018   • Asthma with chronic obstructive pulmonary disease (COPD) ([facility]) 9/12/2018   • Cellulitis due to MRSA 7/23/2018   • Chronic bilateral low back pain with bilateral sciatica 7/24/2018   • Class 3 severe obesity due to excess calories with serious comorbidity and body mass index (BMI) of 60.0 to 69.9 in adult ([facility]) 9/12/2018   • Diabetes mellitus, insulin dependent (IDDM), controlled ([facility]) 3/20/2019   • Drainage from ear, left 11/14/2018   • Dyslipidemia 7/23/2018   • Essential hypertension 7/23/2018   • HERIBERTO (obstructive sleep apnea) 7/23/2018   • Type 2 diabetes mellitus with ophthalmic complication, with long-term current use of insulin ([facility]) 7/23/2018       ROS:   Constitutional: Denies fevers, chills, night sweats, fatigue or weight loss  Eyes: Denies vision loss, pain, drainage, double vision  Ears, Nose, Throat: Denies earache, tinnitus, hoarseness  Cardiovascular: Denies chest pain, tightness, palpitations  Respiratory: See HPI  Sleep: See HPI  GI: Denies abdominal pain, nausea, vomiting, diarrhea  : Denies frequent urination, hematuria, painful urination  Musculoskeletal: Denies back pain, painful joints, sore muscles  Neurological: Denies headaches, seizures  Skin: Denies rashes, color changes  Psychiatric: Denies depression or thoughts of suicide  Hematologic: Denies bleeding tendency or clotting tendency  Allergic/Immunologic: Denies rhinitis, skin sensitivity    Social History     Social History   • Marital status:      Spouse name: N/A   • Number of children: N/A   • Years of education: N/A     Occupational History   • Not on file.     Social History Main Topics   • Smoking status: Never Smoker   • Smokeless tobacco: Never Used   • Alcohol use No   • Drug use: No   • Sexual activity: Not Currently     Other Topics Concern   • Not on file        Social History Narrative   • No narrative on file     Codeine and Latex  Current Outpatient Prescriptions on File Prior to Visit   Medication Sig Dispense Refill   • ibuprofen (MOTRIN) 800 MG Tab Take 1 Tab by mouth every 8 hours as needed. 30 Tab 1   • liraglutide (VICTOZA) 18 MG/3ML Solution Pen-injector injection Inject 0.3 mL as instructed every day. 9 mL 5   • omega-3 acid ethyl esters (LOVAZA) 1 GM capsule Take 2 Caps by mouth 2 Times a Day for 90 days. 360 Cap 2   • omeprazole (PRILOSEC) 40 MG delayed-release capsule Take 1 Cap by mouth every day. 30 Cap 5   • ondansetron (ZOFRAN) 4 MG Tab tablet      • ipratropium (ATROVENT) 17 MCG/ACT Aero Soln Inhale 2 Puffs by mouth every 6 hours.     • gabapentin (NEURONTIN) 300 MG Cap Take 1 Cap by mouth 3 times a day. 90 Cap 3   • amLODIPine (NORVASC) 10 MG Tab Take 1 Tab by mouth every day. 30 Tab 5   • furosemide (LASIX) 80 MG Tab Take 1 Tab by mouth every day. 30 Tab 5   • metformin (GLUCOPHAGE) 1000 MG tablet Take 1 Tab by mouth 2 times a day, with meals. 60 Tab 5   • montelukast (SINGULAIR) 10 MG Tab Take 1 Tab by mouth every day. 30 Tab 5   • Canagliflozin 100 MG Tab Take 100 mg by mouth every day. 30 Tab 5   • Insulin Degludec (TRESIBA FLEXTOUCH) 100 UNIT/ML Solution Pen-injector Inject 69 Units as instructed every bedtime. 1 PEN 5   • Empagliflozin (JARDIANCE) 10 MG Tab Take  by mouth.     • albuterol 108 (90 Base) MCG/ACT Aero Soln inhalation aerosol Inhale 2 Puffs by mouth every 6 hours as needed for Shortness of Breath.     • rosuvastatin (CRESTOR) 20 MG Tab Take 20 mg by mouth every evening.     • aspirin EC (ECOTRIN) 81 MG Tablet Delayed Response Take 81 mg by mouth every day.     • EASY TOUCH PEN NEEDLES 31G X 6 MM Misc Use as directed 100 Each 2   • PHARMACIST CHOICE ALCOHOL 70 % Pads      • fluconazole (DIFLUCAN) 150 MG tablet Take 1 Tab by mouth every day. (Patient not taking: Reported on 3/20/2019) 1 Tab 0   • meclizine (ANTIVERT) 25 MG Tab      •  "ipratropium (ATROVENT) 0.02 % Solution 1 mL by Nebulization route 4 times a day. 300 Bullet 6   • albuterol (PROVENTIL) 2.5mg/3ml Nebu Soln solution for nebulization 3 mL by Nebulization route every four hours as needed for Shortness of Breath. 300 Bullet 6   • amoxicillin-clavulanate (AUGMENTIN) 875-125 MG Tab Take 1 Tab by mouth 2 times a day. (Patient not taking: Reported on 3/20/2019) 20 Tab 0   • fluticasone (FLONASE ALLERGY RELIEF) 50 MCG/ACT nasal spray Spray 1 Spray in nose every day. (Patient not taking: Reported on 4/22/2019) 16 g 6   • NON SPECIFIED Inogen    Requesting Portable O2 with conserving device  Requiring 3-5 L/nc 24/7    84% O2 sat on ambulation without O2  92% O2 sat with O2    Dx: R09.02, G47.33, J44.9 1 Each 11   • NON SPECIFIED Tresiba injector needles  31 guage, 1/4\" 30 Each 11   • mupirocin (BACTROBAN) 2 % Ointment Apply  to affected area(s) every day.       No current facility-administered medications on file prior to visit.      BP (!) 161/72   Pulse 89   Temp 36.3 °C (97.3 °F) (Temporal)   Resp 16   Ht 1.727 m (5' 8\")   Wt (!) 188.7 kg (416 lb)   SpO2 90%   Family History   Problem Relation Age of Onset   • Other Mother         Crohn's disease   • Hypertension Father    • Cancer Father         colon   • Heart Disease Father         CAD       Physical Exam:  BMI is 63  HEENT: PERRLA, EOMI, no scleral icterus, no nasal or oral lesions  Neck: No thyromegaly, no adenopathy, no bruits  Mallampatti: Grade II  Lungs: Distant breath sounds, no wheezes or crackles  Heart: Regular rate and rhythm, no gallops or murmurs  Abdomen: Soft, benign, no organomegaly  Extremities: No clubbing, cyanosis, or edema  Neurologic: Cranial nerve, motor, and sensory exam are normal    1. SOB (shortness of breath)    2. Hypoxemia    3. Chronic obstructive pulmonary disease, unspecified COPD type (HCC)    4. HERIBERTO (obstructive sleep apnea)    5. Increased BMI        This woman has chronic obstructive pulmonary " disease with a bronchospastic component that is currently stable on albuterol, Singulair, and Atrovent.  She has not been tried on long-acting steroids or bronchodilators.  At the present time she feels reasonably stable.  She has not had any recent significant exacerbations.  For now we will make no changes in her medication or oxygen supplementation.  I have suggested that she follow-up with our sleep clinic since she may need updating of her BiPAP equipment.  From a pulmonary perspective we will see her back in about a year for repeat evaluation.  We will see her sooner if she has any issues.      Electronically signed by Raoul Billingsley M.D.  on 04/22/19    No Recipients

## 2019-04-22 NOTE — PROGRESS NOTES
No chief complaint on file.      HPI:  The patient is a 58-year-old woman who has a history of asthma.  She is a former smoker.  She quit smoking in 2006.  She previously smoked 2 to 3 packs of cigarettes a day for 25 to 30 years.  Most of her problems have occurred after 2006 when she was hospitalized in [facility] with what sounds like MRSA pneumonia.  At that time she required intubation and mechanical ventilation.  She had a tracheostomy.  We do not have records of that hospitalization.  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  In 2006 she was started on BiPAP.  She was also started on supplemental oxygen and uses oxygen at night in addition to her BiPAP at 3 to 4 L/min.  She does have a portable oxygen concentrator.  Her medications include albuterol HFA Singulair and Atrovent HFA.  She moved to [place] from [facility] about a year and a half ago.  She was seen in the emergency room in [facility] because of an upper respiratory infection and associated vertigo.  She did not require additional treatment for her asthma at that time.  Other medical problems include her weight of 416 pounds and diabetes mellitus.  She is trying to lose weight.  She has not had any increase in her chronic dyspnea.  She has a chronic cough for the past 13 years.  She occasionally brings up discolored sputum.  She wheezes on occasion and has occasional chest tightness with shortness of breath.  She says she cannot walk very far without becoming short of breath.    Past Medical History:   Diagnosis Date   • Asthma in adult, mild persistent, uncomplicated 7/23/2018   • Asthma with chronic obstructive pulmonary disease (COPD) ([facility]) 9/12/2018   • Cellulitis due to MRSA 7/23/2018   • Chronic bilateral low back pain with bilateral sciatica 7/24/2018   • Class 3 severe obesity due to excess calories  with serious comorbidity and body mass index (BMI) of 60.0 to 69.9 in adult ([facility]) 9/12/2018   • Diabetes mellitus, insulin dependent (IDDM), controlled ([facility]) 3/20/2019   • Drainage from ear, left 11/14/2018   • Dyslipidemia 7/23/2018   • Essential hypertension 7/23/2018   • HERIBERTO (obstructive sleep apnea) 7/23/2018   • Type 2 diabetes mellitus with ophthalmic complication, with long-term current use of insulin ([facility]) 7/23/2018       ROS:   Constitutional: Denies fevers, chills, night sweats, fatigue or weight loss  Eyes: Denies vision loss, pain, drainage, double vision  Ears, Nose, Throat: Denies earache, tinnitus, hoarseness  Cardiovascular: Denies chest pain, tightness, palpitations  Respiratory: See HPI  Sleep: See HPI  GI: Denies abdominal pain, nausea, vomiting, diarrhea  : Denies frequent urination, hematuria, painful urination  Musculoskeletal: Denies back pain, painful joints, sore muscles  Neurological: Denies headaches, seizures  Skin: Denies rashes, color changes  Psychiatric: Denies depression or thoughts of suicide  Hematologic: Denies bleeding tendency or clotting tendency  Allergic/Immunologic: Denies rhinitis, skin sensitivity    Social History     Social History   • Marital status:      Spouse name: N/A   • Number of children: N/A   • Years of education: N/A     Occupational History   • Not on file.     Social History Main Topics   • Smoking status: Never Smoker   • Smokeless tobacco: Never Used   • Alcohol use No   • Drug use: No   • Sexual activity: Not Currently     Other Topics Concern   • Not on file     Social History Narrative   • No narrative on file     Codeine and Latex  Current Outpatient Prescriptions on File Prior to Visit   Medication Sig Dispense Refill   • ibuprofen (MOTRIN) 800 MG Tab Take 1 Tab by mouth every 8 hours as needed. 30 Tab 1   • liraglutide (VICTOZA) 18 MG/3ML Solution Pen-injector injection Inject 0.3 mL as instructed every day. 9 mL 5   • omega-3 acid ethyl  esters (LOVAZA) 1 GM capsule Take 2 Caps by mouth 2 Times a Day for 90 days. 360 Cap 2   • omeprazole (PRILOSEC) 40 MG delayed-release capsule Take 1 Cap by mouth every day. 30 Cap 5   • ondansetron (ZOFRAN) 4 MG Tab tablet      • ipratropium (ATROVENT) 17 MCG/ACT Aero Soln Inhale 2 Puffs by mouth every 6 hours.     • gabapentin (NEURONTIN) 300 MG Cap Take 1 Cap by mouth 3 times a day. 90 Cap 3   • amLODIPine (NORVASC) 10 MG Tab Take 1 Tab by mouth every day. 30 Tab 5   • furosemide (LASIX) 80 MG Tab Take 1 Tab by mouth every day. 30 Tab 5   • metformin (GLUCOPHAGE) 1000 MG tablet Take 1 Tab by mouth 2 times a day, with meals. 60 Tab 5   • montelukast (SINGULAIR) 10 MG Tab Take 1 Tab by mouth every day. 30 Tab 5   • Canagliflozin 100 MG Tab Take 100 mg by mouth every day. 30 Tab 5   • Insulin Degludec (TRESIBA FLEXTOUCH) 100 UNIT/ML Solution Pen-injector Inject 69 Units as instructed every bedtime. 1 PEN 5   • Empagliflozin (JARDIANCE) 10 MG Tab Take  by mouth.     • albuterol 108 (90 Base) MCG/ACT Aero Soln inhalation aerosol Inhale 2 Puffs by mouth every 6 hours as needed for Shortness of Breath.     • rosuvastatin (CRESTOR) 20 MG Tab Take 20 mg by mouth every evening.     • aspirin EC (ECOTRIN) 81 MG Tablet Delayed Response Take 81 mg by mouth every day.     • EASY TOUCH PEN NEEDLES 31G X 6 MM Misc Use as directed 100 Each 2   • PHARMACIST CHOICE ALCOHOL 70 % Pads      • fluconazole (DIFLUCAN) 150 MG tablet Take 1 Tab by mouth every day. (Patient not taking: Reported on 3/20/2019) 1 Tab 0   • meclizine (ANTIVERT) 25 MG Tab      • ipratropium (ATROVENT) 0.02 % Solution 1 mL by Nebulization route 4 times a day. 300 Bullet 6   • albuterol (PROVENTIL) 2.5mg/3ml Nebu Soln solution for nebulization 3 mL by Nebulization route every four hours as needed for Shortness of Breath. 300 Bullet 6   • amoxicillin-clavulanate (AUGMENTIN) 875-125 MG Tab Take 1 Tab by mouth 2 times a day. (Patient not taking: Reported on  "3/20/2019) 20 Tab 0   • fluticasone (FLONASE ALLERGY RELIEF) 50 MCG/ACT nasal spray Spray 1 Spray in nose every day. (Patient not taking: Reported on 4/22/2019) 16 g 6   • NON SPECIFIED Inogen    Requesting Portable O2 with conserving device  Requiring 3-5 L/nc 24/7    84% O2 sat on ambulation without O2  92% O2 sat with O2    Dx: R09.02, G47.33, J44.9 1 Each 11   • NON SPECIFIED Tresiba injector needles  31 guage, 1/4\" 30 Each 11   • mupirocin (BACTROBAN) 2 % Ointment Apply  to affected area(s) every day.       No current facility-administered medications on file prior to visit.      BP (!) 161/72   Pulse 89   Temp 36.3 °C (97.3 °F) (Temporal)   Resp 16   Ht 1.727 m (5' 8\")   Wt (!) 188.7 kg (416 lb)   SpO2 90%   Family History   Problem Relation Age of Onset   • Other Mother         Crohn's disease   • Hypertension Father    • Cancer Father         colon   • Heart Disease Father         CAD       Physical Exam:  BMI is 63  HEENT: PERRLA, EOMI, no scleral icterus, no nasal or oral lesions  Neck: No thyromegaly, no adenopathy, no bruits  Mallampatti: Grade II  Lungs: Distant breath sounds, no wheezes or crackles  Heart: Regular rate and rhythm, no gallops or murmurs  Abdomen: Soft, benign, no organomegaly  Extremities: No clubbing, cyanosis, or edema  Neurologic: Cranial nerve, motor, and sensory exam are normal    1. SOB (shortness of breath)    2. Hypoxemia    3. Chronic obstructive pulmonary disease, unspecified COPD type (HCC)    4. HERIBERTO (obstructive sleep apnea)    5. Increased BMI        This woman has chronic obstructive pulmonary disease with a bronchospastic component that is currently stable on albuterol, Singulair, and Atrovent.  She has not been tried on long-acting steroids or bronchodilators.  At the present time she feels reasonably stable.  She has not had any recent significant exacerbations.  For now we will make no changes in her medication or oxygen supplementation.  I have suggested that " she follow-up with our sleep clinic since she may need updating of her BiPAP equipment.  From a pulmonary perspective we will see her back in about a year for repeat evaluation.  We will see her sooner if she has any issues.

## 2019-04-25 DIAGNOSIS — G62.9 NEUROPATHY: ICD-10-CM

## 2019-04-25 DIAGNOSIS — I10 ESSENTIAL HYPERTENSION: ICD-10-CM

## 2019-04-25 RX ORDER — GABAPENTIN 300 MG/1
300 CAPSULE ORAL 3 TIMES DAILY
Qty: 90 CAP | Refills: 0 | Status: SHIPPED | OUTPATIENT
Start: 2019-04-25 | End: 2019-05-21 | Stop reason: SDUPTHER

## 2019-04-25 NOTE — TELEPHONE ENCOUNTER
Was the patient seen in the last year in this department? Yes    Does patient have an active prescription for medications requested? Yes    Received Request Via: Pharmacy     Requested Prescriptions     Pending Prescriptions Disp Refills   • gabapentin (NEURONTIN) 300 MG Cap 90 Cap 3     Sig: Take 1 Cap by mouth 3 times a day.

## 2019-05-20 ENCOUNTER — TELEPHONE (OUTPATIENT)
Dept: MEDICAL GROUP | Facility: CLINIC | Age: 59
End: 2019-05-20

## 2019-05-24 DIAGNOSIS — G47.33 OSA (OBSTRUCTIVE SLEEP APNEA): ICD-10-CM

## 2019-06-10 DIAGNOSIS — E11.21 CONTROLLED TYPE 2 DIABETES MELLITUS WITH DIABETIC NEPHROPATHY, WITHOUT LONG-TERM CURRENT USE OF INSULIN (HCC): ICD-10-CM

## 2019-06-10 DIAGNOSIS — I10 ESSENTIAL HYPERTENSION: ICD-10-CM

## 2019-06-11 RX ORDER — AMLODIPINE BESYLATE 10 MG/1
10 TABLET ORAL DAILY
Qty: 30 TAB | Refills: 5 | Status: SHIPPED | OUTPATIENT
Start: 2019-06-11 | End: 2019-11-25 | Stop reason: SDUPTHER

## 2019-06-18 DIAGNOSIS — Z91.09 ENVIRONMENTAL ALLERGIES: ICD-10-CM

## 2019-06-18 DIAGNOSIS — K21.9 GASTROESOPHAGEAL REFLUX DISEASE WITHOUT ESOPHAGITIS: ICD-10-CM

## 2019-06-18 DIAGNOSIS — E78.5 DYSLIPIDEMIA: ICD-10-CM

## 2019-06-18 DIAGNOSIS — M25.50 MULTIPLE JOINT PAIN: ICD-10-CM

## 2019-06-18 DIAGNOSIS — I10 ESSENTIAL HYPERTENSION: ICD-10-CM

## 2019-06-18 RX ORDER — FUROSEMIDE 80 MG
80 TABLET ORAL DAILY
Qty: 30 TAB | Refills: 5 | Status: SHIPPED | OUTPATIENT
Start: 2019-06-18 | End: 2019-11-25 | Stop reason: SDUPTHER

## 2019-06-18 RX ORDER — IBUPROFEN 800 MG/1
800 TABLET ORAL EVERY 8 HOURS PRN
Qty: 30 TAB | Refills: 1 | Status: SHIPPED | OUTPATIENT
Start: 2019-06-18 | End: 2019-07-19 | Stop reason: SDUPTHER

## 2019-06-18 RX ORDER — MONTELUKAST SODIUM 10 MG/1
10 TABLET ORAL DAILY
Qty: 30 TAB | Refills: 5 | Status: SHIPPED | OUTPATIENT
Start: 2019-06-18 | End: 2019-11-25 | Stop reason: SDUPTHER

## 2019-06-18 RX ORDER — OMEPRAZOLE 40 MG/1
40 CAPSULE, DELAYED RELEASE ORAL DAILY
Qty: 30 CAP | Refills: 5 | Status: SHIPPED | OUTPATIENT
Start: 2019-06-18 | End: 2019-11-25 | Stop reason: SDUPTHER

## 2019-06-18 RX ORDER — ROSUVASTATIN CALCIUM 20 MG/1
20 TABLET, COATED ORAL EVERY EVENING
Qty: 30 TAB | Refills: 5 | Status: SHIPPED | OUTPATIENT
Start: 2019-06-18 | End: 2019-11-25 | Stop reason: SDUPTHER

## 2019-06-18 NOTE — TELEPHONE ENCOUNTER
Was the patient seen in the last year in this department? Yes    Does patient have an active prescription for medications requested? Yes    Received Request Via: Pharmacy     Requested Prescriptions     Pending Prescriptions Disp Refills   • rosuvastatin (CRESTOR) 20 MG Tab       Sig: Take 1 Tab by mouth every evening.   • montelukast (SINGULAIR) 10 MG Tab 30 Tab 5     Sig: Take 1 Tab by mouth every day.   • furosemide (LASIX) 80 MG Tab 30 Tab 5     Sig: Take 1 Tab by mouth every day.   • ibuprofen (MOTRIN) 800 MG Tab 30 Tab 1     Sig: Take 1 Tab by mouth every 8 hours as needed.

## 2019-06-18 NOTE — TELEPHONE ENCOUNTER
Was the patient seen in the last year in this department? Yes    Does patient have an active prescription for medications requested? Yes    Received Request Via: Pharmacy     Requested Prescriptions     Pending Prescriptions Disp Refills   • omeprazole (PRILOSEC) 40 MG delayed-release capsule 30 Cap 5     Sig: Take 1 Cap by mouth every day.

## 2019-06-20 ENCOUNTER — TELEMEDICINE ORIGINATING SITE VISIT (OUTPATIENT)
Dept: MEDICAL GROUP | Facility: CLINIC | Age: 59
End: 2019-06-20
Payer: MEDICARE

## 2019-06-20 ENCOUNTER — TELEMEDICINE2 (OUTPATIENT)
Dept: MEDICAL GROUP | Age: 59
End: 2019-06-20
Payer: MEDICARE

## 2019-06-20 VITALS
OXYGEN SATURATION: 91 % | HEART RATE: 86 BPM | HEIGHT: 68 IN | DIASTOLIC BLOOD PRESSURE: 62 MMHG | BODY MASS INDEX: 44.41 KG/M2 | WEIGHT: 293 LBS | TEMPERATURE: 97.8 F | SYSTOLIC BLOOD PRESSURE: 132 MMHG

## 2019-06-20 DIAGNOSIS — I10 ESSENTIAL HYPERTENSION: ICD-10-CM

## 2019-06-20 DIAGNOSIS — J44.89 ASTHMA WITH CHRONIC OBSTRUCTIVE PULMONARY DISEASE (COPD): ICD-10-CM

## 2019-06-20 DIAGNOSIS — E66.01 CLASS 3 SEVERE OBESITY DUE TO EXCESS CALORIES WITH SERIOUS COMORBIDITY AND BODY MASS INDEX (BMI) OF 60.0 TO 69.9 IN ADULT (HCC): ICD-10-CM

## 2019-06-20 PROCEDURE — 99214 OFFICE O/P EST MOD 30 MIN: CPT | Performed by: INTERNAL MEDICINE

## 2019-06-30 NOTE — PROGRESS NOTES
CC: 3-month follow-up visit    Verified identification with patient. Secured video conference with RN presenter in [facility]      HPI:   Eddie presents today with the following.    1. Asthma with chronic obstructive pulmonary disease (COPD) ([facility])  Patient was seen by pulmonology in April.  Patient continues to be treated with albuterol, Singulair and Atrovent.  No recent exacerbations.  Patient uses BiPAP and has an appointment with the sleep clinic.  Per pulmonology, continue current plan of care.  Stable.  1 year follow-up.    2. Class 3 severe obesity due to excess calories with serious comorbidity and body mass index (BMI) of 60.0 to 69.9 in adult ([facility])  Patient's current weight 410 pounds, down from 416.  BMI 62.95.  Patient has been under a lot of stress and not been focusing on her diet and exercise routine.  Many stressors to include mental health issues with her kids.    3. Diabetes mellitus, insulin dependent (IDDM), controlled ([facility])  Patient is currently treated with Victoza and Tresiba.  Patient decreased her metformin to 1000 mg daily.  Hemoglobin A1c in March was 7.2.  Patient total cholesterol 170, triglyceride 173 with an HDL of 41, treated with Crestor.    4. Essential hypertension  Blood pressure controlled with Norvasc 10 mg daily.  Patient was intolerant to ACE inhibitors and ARB's.  Patient states blood pressures usually in the 130s over 70s at home.      Patient Active Problem List    Diagnosis Date Noted   • Diabetes mellitus, insulin dependent (IDDM), controlled ([facility]) 03/20/2019   • Skin neoplasm 01/29/2019   • Drainage from ear, left 11/14/2018   • Asthma with chronic obstructive pulmonary disease (COPD) ([facility]) 09/12/2018   • Class 3 severe obesity due to excess calories with serious comorbidity and body mass index (BMI) of 60.0 to 69.9 in adult ([facility]) 09/12/2018   • Chronic bilateral low back pain with bilateral sciatica 07/24/2018   • Asthma in adult, mild persistent, uncomplicated  07/23/2018   • HERIBERTO (obstructive sleep apnea) 07/23/2018   • Type 2 diabetes mellitus with ophthalmic complication, with long-term current use of insulin (H[facility]) 07/23/2018   • Dyslipidemia 07/23/2018   • Essential hypertension 07/23/2018   • Cellulitis due to MRSA 07/23/2018       Current Outpatient Prescriptions   Medication Sig Dispense Refill   • omeprazole (PRILOSEC) 40 MG delayed-release capsule Take 1 Cap by mouth every day. 30 Cap 5   • rosuvastatin (CRESTOR) 20 MG Tab Take 1 Tab by mouth every evening. 30 Tab 5   • montelukast (SINGULAIR) 10 MG Tab Take 1 Tab by mouth every day. 30 Tab 5   • furosemide (LASIX) 80 MG Tab Take 1 Tab by mouth every day. 30 Tab 5   • ibuprofen (MOTRIN) 800 MG Tab Take 1 Tab by mouth every 8 hours as needed. 30 Tab 1   • metformin (GLUCOPHAGE) 1000 MG tablet Take 1 Tab by mouth 2 times a day, with meals. 60 Tab 5   • amLODIPine (NORVASC) 10 MG Tab Take 1 Tab by mouth every day. 30 Tab 5   • gabapentin (NEURONTIN) 300 MG Cap Take 1 Cap by mouth 3 times a day. 90 Cap 3   • liraglutide (VICTOZA) 18 MG/3ML Solution Pen-injector injection Inject 0.3 mL as instructed every day. 9 mL 5   • ipratropium (ATROVENT) 0.02 % Solution 1 mL by Nebulization route 4 times a day. 300 Bullet 6   • Canagliflozin 100 MG Tab Take 100 mg by mouth every day. 30 Tab 5   • Insulin Degludec (TRESIBA FLEXTOUCH) 100 UNIT/ML Solution Pen-injector Inject 69 Units as instructed every bedtime. 1 PEN 5   • EASY TOUCH PEN NEEDLES 31G X 6 MM Misc Use as directed 100 Each 2   • PHARMACIST CHOICE ALCOHOL 70 % Pads      • fluconazole (DIFLUCAN) 150 MG tablet Take 1 Tab by mouth every day. (Patient not taking: Reported on 3/20/2019) 1 Tab 0   • meclizine (ANTIVERT) 25 MG Tab      • ondansetron (ZOFRAN) 4 MG Tab tablet      • ipratropium (ATROVENT) 17 MCG/ACT Aero Soln Inhale 2 Puffs by mouth every 6 hours.     • albuterol (PROVENTIL) 2.5mg/3ml Nebu Soln solution for nebulization 3 mL by Nebulization route every four  "hours as needed for Shortness of Breath. 300 Bullet 6   • amoxicillin-clavulanate (AUGMENTIN) 875-125 MG Tab Take 1 Tab by mouth 2 times a day. (Patient not taking: Reported on 3/20/2019) 20 Tab 0   • fluticasone (FLONASE ALLERGY RELIEF) 50 MCG/ACT nasal spray Spray 1 Spray in nose every day. (Patient not taking: Reported on 4/22/2019) 16 g 6   • NON SPECIFIED Inogen    Requesting Portable O2 with conserving device  Requiring 3-5 L/nc 24/7    84% O2 sat on ambulation without O2  92% O2 sat with O2    Dx: R09.02, G47.33, J44.9 1 Each 11   • NON SPECIFIED Tresiba injector needles  31 guage, 1/4\" 30 Each 11   • Empagliflozin (JARDIANCE) 10 MG Tab Take  by mouth.     • mupirocin (BACTROBAN) 2 % Ointment Apply  to affected area(s) every day.     • albuterol 108 (90 Base) MCG/ACT Aero Soln inhalation aerosol Inhale 2 Puffs by mouth every 6 hours as needed for Shortness of Breath.     • aspirin EC (ECOTRIN) 81 MG Tablet Delayed Response Take 81 mg by mouth every day.       No current facility-administered medications for this visit.          Allergies as of 06/20/2019 - Reviewed 06/20/2019   Allergen Reaction Noted   • Codeine  07/23/2018   • Latex  07/23/2018        ROS: As per HPI.  Denies all other cardiopulmonary, GI, neurologic symptoms.    /62 (BP Location: Right arm, Patient Position: Sitting)   Pulse 86   Temp 36.6 °C (97.8 °F) (Temporal)   Ht 1.727 m (5' 8\")   Wt (!) 186 kg (410 lb)   SpO2 91%   BMI 62.34 kg/m²     Physical Exam:  Gen:         Alert and oriented, No apparent distress.  Obese.  Neck:        No Lymphadenopathy or Bruits.  No thyromegaly.  Lungs:     Decreased breath sounds throughout, most likely secondary to body habitus.  No wheezing rhonchi or crackles noted.  CV:          Regular rate and rhythm. No murmurs, rubs or gallops.  Abd:         Soft non tender, non distended. Normal active bowel sounds.  No  Hepatosplenomegaly, No pulsatile masses.                   Ext:          No " clubbing, cyanosis, edema.  Neuro:      Grossly intact.    Assessment and Plan.   58 y.o. female with the following issues.    1. Asthma with chronic obstructive pulmonary disease (COPD) ([facility])  Follow-up with sleep clinic  Continue current plan of care, stable.    2. Class 3 severe obesity due to excess calories with serious comorbidity and body mass index (BMI) of 60.0 to 69.9 in adult ([facility])  Counseled patient today on diet and lifestyle modifications to soothe her routine.  Consider bariatric surgery referral    3. Diabetes mellitus, insulin dependent (IDDM), controlled ([facility])  Patient will try to focus on nutrition, diabetic diet, decreasing carbohydrates, weight loss.    Recheck hemoglobin A1c in 3 months  Retinal exam due on follow-up    4. Essential hypertension  Monitor blood pressure closely.            Please note that this dictation was created using voice recognition software. I have made every reasonable attempt to correct obvious errors, but expect that there are errors of grammar and possible content that I did not discover before finalizing note.

## 2019-07-16 DIAGNOSIS — L08.9 SKIN INFECTION: ICD-10-CM

## 2019-07-16 NOTE — TELEPHONE ENCOUNTER
Was the patient seen in the last year in this department? Yes    Does patient have an active prescription for medications requested? Yes    Received Request Via: Pharmacy     Requested Prescriptions     Pending Prescriptions Disp Refills   • mupirocin (BACTROBAN) 2 % Ointment       Sig: Apply  to affected area(s) every day.

## 2019-07-17 ENCOUNTER — OFFICE VISIT (OUTPATIENT)
Dept: MEDICAL GROUP | Facility: CLINIC | Age: 59
End: 2019-07-17
Payer: MEDICARE

## 2019-07-17 VITALS
RESPIRATION RATE: 20 BRPM | HEIGHT: 68 IN | DIASTOLIC BLOOD PRESSURE: 64 MMHG | WEIGHT: 293 LBS | OXYGEN SATURATION: 93 % | SYSTOLIC BLOOD PRESSURE: 148 MMHG | TEMPERATURE: 99.5 F | BODY MASS INDEX: 44.41 KG/M2 | HEART RATE: 90 BPM

## 2019-07-17 DIAGNOSIS — L03.012 CELLULITIS OF FINGER OF LEFT HAND: ICD-10-CM

## 2019-07-17 PROBLEM — L03.90 CELLULITIS: Status: ACTIVE | Noted: 2019-07-17

## 2019-07-17 PROCEDURE — 99213 OFFICE O/P EST LOW 20 MIN: CPT | Performed by: PHYSICIAN ASSISTANT

## 2019-07-17 RX ORDER — FLUCONAZOLE 150 MG/1
TABLET ORAL
Qty: 2 TAB | Refills: 0 | Status: SHIPPED | OUTPATIENT
Start: 2019-07-17 | End: 2019-07-17 | Stop reason: SDUPTHER

## 2019-07-17 RX ORDER — FLUCONAZOLE 150 MG/1
TABLET ORAL
Qty: 2 TAB | Refills: 0 | Status: SHIPPED | OUTPATIENT
Start: 2019-07-17 | End: 2019-12-03

## 2019-07-17 RX ORDER — SULFAMETHOXAZOLE AND TRIMETHOPRIM 800; 160 MG/1; MG/1
1 TABLET ORAL 2 TIMES DAILY
Qty: 20 TAB | Refills: 0 | Status: SHIPPED | OUTPATIENT
Start: 2019-07-17 | End: 2019-07-24

## 2019-07-17 ASSESSMENT — PATIENT HEALTH QUESTIONNAIRE - PHQ9: CLINICAL INTERPRETATION OF PHQ2 SCORE: 0

## 2019-07-17 NOTE — PROGRESS NOTES
cc:  blisters    Subjective:     Eddie Ricardo is a 59 y.o. female presenting for blisters      Patient presents the office today with complaint of blisters.They are only on her left hand.  She has a history of staff and states that it is in the left nostril.  She states her daughter has had a similar reaction to gluten.  Patient has been tested in the past and has tested positive for a gluten allergy. The blisters on her hand are painful and itchy.  She has had this for a week and it is getting worse on her left hand.      Review of systems:  See above.       Current Outpatient Prescriptions:   •  sulfamethoxazole-trimethoprim (BACTRIM DS) 800-160 MG tablet, Take 1 Tab by mouth 2 times a day., Disp: 20 Tab, Rfl: 0  •  fluconazole (DIFLUCAN) 150 MG tablet, Take one tab weekly for 2 weeks., Disp: 2 Tab, Rfl: 0  •  mupirocin (BACTROBAN) 2 % Ointment, Apply to affected area daily, Disp: 20 g, Rfl: 1  •  omeprazole (PRILOSEC) 40 MG delayed-release capsule, Take 1 Cap by mouth every day., Disp: 30 Cap, Rfl: 5  •  rosuvastatin (CRESTOR) 20 MG Tab, Take 1 Tab by mouth every evening., Disp: 30 Tab, Rfl: 5  •  montelukast (SINGULAIR) 10 MG Tab, Take 1 Tab by mouth every day., Disp: 30 Tab, Rfl: 5  •  furosemide (LASIX) 80 MG Tab, Take 1 Tab by mouth every day., Disp: 30 Tab, Rfl: 5  •  ibuprofen (MOTRIN) 800 MG Tab, Take 1 Tab by mouth every 8 hours as needed., Disp: 30 Tab, Rfl: 1  •  metformin (GLUCOPHAGE) 1000 MG tablet, Take 1 Tab by mouth 2 times a day, with meals., Disp: 60 Tab, Rfl: 5  •  amLODIPine (NORVASC) 10 MG Tab, Take 1 Tab by mouth every day., Disp: 30 Tab, Rfl: 5  •  gabapentin (NEURONTIN) 300 MG Cap, Take 1 Cap by mouth 3 times a day., Disp: 90 Cap, Rfl: 3  •  EASY TOUCH PEN NEEDLES 31G X 6 MM Misc, Use as directed, Disp: 100 Each, Rfl: 2  •  liraglutide (VICTOZA) 18 MG/3ML Solution Pen-injector injection, Inject 0.3 mL as instructed every day., Disp: 9 mL, Rfl: 5  •  PHARMACIST CHOICE ALCOHOL 70 % Pads, ,  "Disp: , Rfl:   •  fluconazole (DIFLUCAN) 150 MG tablet, Take 1 Tab by mouth every day. (Patient not taking: Reported on 3/20/2019), Disp: 1 Tab, Rfl: 0  •  meclizine (ANTIVERT) 25 MG Tab, , Disp: , Rfl:   •  ondansetron (ZOFRAN) 4 MG Tab tablet, , Disp: , Rfl:   •  ipratropium (ATROVENT) 17 MCG/ACT Aero Soln, Inhale 2 Puffs by mouth every 6 hours., Disp: , Rfl:   •  ipratropium (ATROVENT) 0.02 % Solution, 1 mL by Nebulization route 4 times a day., Disp: 300 Bullet, Rfl: 6  •  albuterol (PROVENTIL) 2.5mg/3ml Nebu Soln solution for nebulization, 3 mL by Nebulization route every four hours as needed for Shortness of Breath., Disp: 300 Bullet, Rfl: 6  •  amoxicillin-clavulanate (AUGMENTIN) 875-125 MG Tab, Take 1 Tab by mouth 2 times a day. (Patient not taking: Reported on 3/20/2019), Disp: 20 Tab, Rfl: 0  •  fluticasone (FLONASE ALLERGY RELIEF) 50 MCG/ACT nasal spray, Spray 1 Spray in nose every day. (Patient not taking: Reported on 4/22/2019), Disp: 16 g, Rfl: 6  •  Canagliflozin 100 MG Tab, Take 100 mg by mouth every day., Disp: 30 Tab, Rfl: 5  •  Insulin Degludec (TRESIBA FLEXTOUCH) 100 UNIT/ML Solution Pen-injector, Inject 69 Units as instructed every bedtime., Disp: 1 PEN, Rfl: 5  •  NON SPECIFIED, Inogen  Requesting Portable O2 with conserving device Requiring 3-5 L/nc 24/7  84% O2 sat on ambulation without O2 92% O2 sat with O2  Dx: R09.02, G47.33, J44.9, Disp: 1 Each, Rfl: 11  •  NON SPECIFIED, Tresiba injector needles 31 guage, 1/4\", Disp: 30 Each, Rfl: 11  •  Empagliflozin (JARDIANCE) 10 MG Tab, Take  by mouth., Disp: , Rfl:   •  albuterol 108 (90 Base) MCG/ACT Aero Soln inhalation aerosol, Inhale 2 Puffs by mouth every 6 hours as needed for Shortness of Breath., Disp: , Rfl:   •  aspirin EC (ECOTRIN) 81 MG Tablet Delayed Response, Take 81 mg by mouth every day., Disp: , Rfl:     Allergies, past medical history, past surgical history, family history, social history reviewed and updated    Objective: " "    Vitals: /64 (BP Location: Right arm, Patient Position: Sitting)   Pulse 90   Temp 37.5 °C (99.5 °F)   Resp 20   Ht 1.727 m (5' 8\")   Wt (!) 179.2 kg (395 lb)   SpO2 93%   BMI 60.06 kg/m²   General: Alert, pleasant, NAD  EYES:   PERRL, EOMI, no icterus or pallor.  Conjunctivae and lids normal.   HENT:  Normocephalic.  External ears normal.  Neck supple.   Abdomen: obese  Skin: Warm, dry, no rashes.  Musculoskeletal: Gait is normal.  Moves all extremities well.  Extremities: Moderate sized pustules or blisters on posterior surface of patient's left hand.  Approximately 5-6 few are draining.  Most of them are firm.  Painful upon palpation.  Neurological: No tremors, sensation grossly intact. CN2-12 intact.  Psych:  Affect/mood is normal, judgement is good, memory is intact, grooming is appropriate.    Assessment/Plan:     Eddie was seen today for blisters.    Diagnoses and all orders for this visit:    Cellulitis of finger of left hand  -     CULTURE WOUND W/ GRAM STAIN; Future  -     sulfamethoxazole-trimethoprim (BACTRIM DS) 800-160 MG tablet; Take 1 Tab by mouth 2 times a day.  -     REFERRAL TO DERMATOLOGY  -     fluconazole (DIFLUCAN) 150 MG tablet; Take one tab weekly for 2 weeks.    Culture collected and will be sent to lab for analysis.  We will treat with Bactrim and Diflucan as she is prone to yeast infections.  We will submit a referral to dermatology in case this does not improve.  We can always cancel if antibiotics are effective.  We will follow-up in 5 days.  ER precautions given.    Return in about 5 days (around 7/22/2019) for Monday.    Please note that this dictation was created using voice recognition software. I have made every reasonable attempt to correct obvious errors, but expect that there are errors of grammar and possible content that I did not discover before finalizing note.   "

## 2019-07-19 DIAGNOSIS — M25.50 MULTIPLE JOINT PAIN: ICD-10-CM

## 2019-07-22 LAB
BACTERIA SPEC AEROBE CULT: NORMAL
BACTERIA SPEC ANAEROBE CULT: NORMAL
BACTERIA SPEC CULT: NORMAL
BACTERIA SPEC CULT: NORMAL

## 2019-07-23 RX ORDER — IBUPROFEN 800 MG/1
800 TABLET ORAL EVERY 8 HOURS PRN
Qty: 30 TAB | Refills: 1 | Status: SHIPPED | OUTPATIENT
Start: 2019-07-23 | End: 2019-08-26 | Stop reason: SDUPTHER

## 2019-07-24 ENCOUNTER — OFFICE VISIT (OUTPATIENT)
Dept: MEDICAL GROUP | Facility: CLINIC | Age: 59
End: 2019-07-24
Payer: MEDICARE

## 2019-07-24 ENCOUNTER — APPOINTMENT (OUTPATIENT)
Dept: RADIOLOGY | Facility: IMAGING CENTER | Age: 59
End: 2019-07-24
Attending: PHYSICIAN ASSISTANT
Payer: MEDICARE

## 2019-07-24 VITALS
DIASTOLIC BLOOD PRESSURE: 61 MMHG | TEMPERATURE: 99.2 F | WEIGHT: 293 LBS | BODY MASS INDEX: 44.41 KG/M2 | OXYGEN SATURATION: 95 % | SYSTOLIC BLOOD PRESSURE: 145 MMHG | HEART RATE: 86 BPM | RESPIRATION RATE: 18 BRPM | HEIGHT: 68 IN

## 2019-07-24 DIAGNOSIS — L92.0 GRANULOMA ANNULARE: ICD-10-CM

## 2019-07-24 DIAGNOSIS — L03.012 CELLULITIS OF FINGER OF LEFT HAND: ICD-10-CM

## 2019-07-24 PROCEDURE — 99213 OFFICE O/P EST LOW 20 MIN: CPT | Mod: 25 | Performed by: PHYSICIAN ASSISTANT

## 2019-07-24 PROCEDURE — 73130 X-RAY EXAM OF HAND: CPT | Mod: TC,LT | Performed by: PHYSICIAN ASSISTANT

## 2019-07-24 RX ORDER — TRIAMCINOLONE ACETONIDE 1 MG/G
OINTMENT TOPICAL
Qty: 120 G | Refills: 1 | Status: SHIPPED | OUTPATIENT
Start: 2019-07-24

## 2019-07-24 NOTE — PROGRESS NOTES
cc:  Follow up cellulitis    Subjective:     Eddie Ricardo is a 59 y.o. female presenting for follow up cellulitis      Patient presents to the office for follow up cellulitis.  Her hand has not improved.  The lesions are more swollen and more painful.  The lesion at the base or her left index finger is larger and has eschar forming.  There is no drainage.  She denies fever and chills. It throbs.  It feels like she has been stung by stinging izzy.  It is hurting from the base of the index into the knuckle.  She states that she could not start the antibiotic until 2 days ago as Rx was sent to [facility]. There are only 5 lesions on her left hand.  She has these no where else.  The pain is a 6 out of 10.  There was a brown recluse spider in the bathroom that was found and killed, but not at the time of the lesions.    Review of systems:  See above.       Current Outpatient Prescriptions:   •  triamcinolone acetonide (KENALOG) 0.1 % Ointment, Apply to affected area twice a day, Disp: 120 g, Rfl: 1  •  ibuprofen (MOTRIN) 800 MG Tab, Take 1 Tab by mouth every 8 hours as needed., Disp: 30 Tab, Rfl: 1  •  mupirocin (BACTROBAN) 2 % Ointment, Apply to affected area daily, Disp: 20 g, Rfl: 1  •  fluconazole (DIFLUCAN) 150 MG tablet, Take one tab weekly for 2 weeks., Disp: 2 Tab, Rfl: 0  •  omeprazole (PRILOSEC) 40 MG delayed-release capsule, Take 1 Cap by mouth every day., Disp: 30 Cap, Rfl: 5  •  rosuvastatin (CRESTOR) 20 MG Tab, Take 1 Tab by mouth every evening., Disp: 30 Tab, Rfl: 5  •  montelukast (SINGULAIR) 10 MG Tab, Take 1 Tab by mouth every day., Disp: 30 Tab, Rfl: 5  •  furosemide (LASIX) 80 MG Tab, Take 1 Tab by mouth every day., Disp: 30 Tab, Rfl: 5  •  metformin (GLUCOPHAGE) 1000 MG tablet, Take 1 Tab by mouth 2 times a day, with meals., Disp: 60 Tab, Rfl: 5  •  amLODIPine (NORVASC) 10 MG Tab, Take 1 Tab by mouth every day., Disp: 30 Tab, Rfl: 5  •  gabapentin (NEURONTIN) 300 MG Cap, Take 1 Cap by mouth 3  "times a day., Disp: 90 Cap, Rfl: 3  •  EASY TOUCH PEN NEEDLES 31G X 6 MM Misc, Use as directed, Disp: 100 Each, Rfl: 2  •  liraglutide (VICTOZA) 18 MG/3ML Solution Pen-injector injection, Inject 0.3 mL as instructed every day., Disp: 9 mL, Rfl: 5  •  PHARMACIST CHOICE ALCOHOL 70 % Pads, , Disp: , Rfl:   •  fluconazole (DIFLUCAN) 150 MG tablet, Take 1 Tab by mouth every day. (Patient not taking: Reported on 3/20/2019), Disp: 1 Tab, Rfl: 0  •  meclizine (ANTIVERT) 25 MG Tab, , Disp: , Rfl:   •  ondansetron (ZOFRAN) 4 MG Tab tablet, , Disp: , Rfl:   •  ipratropium (ATROVENT) 17 MCG/ACT Aero Soln, Inhale 2 Puffs by mouth every 6 hours., Disp: , Rfl:   •  ipratropium (ATROVENT) 0.02 % Solution, 1 mL by Nebulization route 4 times a day., Disp: 300 Bullet, Rfl: 6  •  albuterol (PROVENTIL) 2.5mg/3ml Nebu Soln solution for nebulization, 3 mL by Nebulization route every four hours as needed for Shortness of Breath., Disp: 300 Bullet, Rfl: 6  •  amoxicillin-clavulanate (AUGMENTIN) 875-125 MG Tab, Take 1 Tab by mouth 2 times a day. (Patient not taking: Reported on 3/20/2019), Disp: 20 Tab, Rfl: 0  •  fluticasone (FLONASE ALLERGY RELIEF) 50 MCG/ACT nasal spray, Spray 1 Spray in nose every day. (Patient not taking: Reported on 4/22/2019), Disp: 16 g, Rfl: 6  •  Canagliflozin 100 MG Tab, Take 100 mg by mouth every day., Disp: 30 Tab, Rfl: 5  •  Insulin Degludec (TRESIBA FLEXTOUCH) 100 UNIT/ML Solution Pen-injector, Inject 69 Units as instructed every bedtime., Disp: 1 PEN, Rfl: 5  •  NON SPECIFIED, Inogen  Requesting Portable O2 with conserving device Requiring 3-5 L/nc 24/7  84% O2 sat on ambulation without O2 92% O2 sat with O2  Dx: R09.02, G47.33, J44.9, Disp: 1 Each, Rfl: 11  •  NON SPECIFIED, Tresiba injector needles 31 guage, 1/4\", Disp: 30 Each, Rfl: 11  •  Empagliflozin (JARDIANCE) 10 MG Tab, Take  by mouth., Disp: , Rfl:   •  albuterol 108 (90 Base) MCG/ACT Aero Soln inhalation aerosol, Inhale 2 Puffs by mouth every 6 " "hours as needed for Shortness of Breath., Disp: , Rfl:   •  aspirin EC (ECOTRIN) 81 MG Tablet Delayed Response, Take 81 mg by mouth every day., Disp: , Rfl:     Allergies, past medical history, past surgical history, family history, social history reviewed and updated    Objective:     Vitals: /61 (BP Location: Right arm, Patient Position: Sitting)   Pulse 86   Temp 37.3 °C (99.2 °F)   Resp 18   Ht 1.727 m (5' 8\")   Wt (!) 179.2 kg (395 lb)   SpO2 95%   BMI 60.06 kg/m²    General: Alert, pleasant, NAD  EYES:   PERRL, EOMI, no icterus or pallor.  Conjunctivae and lids normal.   HENT:  Normocephalic.  External ears normal.  Neck supple.   Abdomen: obese  Skin: Warm, dry, papular rash on left hand.  Lesion at left index base is spreading distally posterior finger With 4 more across the posterior surface of the hand.  They have raised borders with an indentation or flat center like a ring.  Musculoskeletal: Gait is normal.  Moves all extremities well.  Neurological: No tremors, sensation grossly intact, CN2-12 intact.  Psych:  Affect/mood is normal, judgement is good, memory is intact, grooming is appropriate.    Assessment/Plan:     Eddie was seen today for follow-up and preseptal cellulitis.    Diagnoses and all orders for this visit:    Cellulitis of finger of left hand  -     DX-HAND 3+ LEFT; Future  -     triamcinolone acetonide (KENALOG) 0.1 % Ointment; Apply to affected area twice a day  Granuloma annulare  -     triamcinolone acetonide (KENALOG) 0.1 % Ointment; Apply to affected area twice a day  Rash appears similar to granuloma annulare.  We will try to triamcinolone ointment and stop the antibiotic as it has not been effective.  Avoid sun.  Dermatology referral information given to patient.  X-rays reviewed and discussed with patient in office.  She will be going to [place] this next week.  I would like to follow-up with her when she returns.  She will make an appointment if symptoms are not " improving before the eighth.  Urgent care/ER precautions given.  Risks of steroid medication given.    20 minutes spent researching and waiting for x-ray results while patient waited in office.      No Follow-up on file.    Please note that this dictation was created using voice recognition software. I have made every reasonable attempt to correct obvious errors, but expect that there are errors of grammar and possible content that I did not discover before finalizing note.

## 2019-07-30 ENCOUNTER — TELEPHONE (OUTPATIENT)
Dept: MEDICAL GROUP | Facility: CLINIC | Age: 59
End: 2019-07-30

## 2019-08-06 ENCOUNTER — TELEMEDICINE2 (OUTPATIENT)
Dept: SLEEP MEDICINE | Facility: MEDICAL CENTER | Age: 59
End: 2019-08-06
Payer: MEDICARE

## 2019-08-06 ENCOUNTER — TELEMEDICINE ORIGINATING SITE VISIT (OUTPATIENT)
Dept: MEDICAL GROUP | Facility: CLINIC | Age: 59
End: 2019-08-06
Payer: MEDICARE

## 2019-08-06 ENCOUNTER — OFFICE VISIT (OUTPATIENT)
Dept: MEDICAL GROUP | Facility: CLINIC | Age: 59
End: 2019-08-06
Payer: MEDICARE

## 2019-08-06 VITALS
OXYGEN SATURATION: 91 % | HEART RATE: 84 BPM | DIASTOLIC BLOOD PRESSURE: 65 MMHG | SYSTOLIC BLOOD PRESSURE: 147 MMHG | BODY MASS INDEX: 44.41 KG/M2 | HEIGHT: 68 IN | WEIGHT: 293 LBS | RESPIRATION RATE: 18 BRPM

## 2019-08-06 VITALS
HEIGHT: 68 IN | DIASTOLIC BLOOD PRESSURE: 65 MMHG | SYSTOLIC BLOOD PRESSURE: 147 MMHG | HEART RATE: 84 BPM | TEMPERATURE: 98.9 F | RESPIRATION RATE: 16 BRPM | WEIGHT: 293 LBS | BODY MASS INDEX: 44.41 KG/M2 | OXYGEN SATURATION: 91 %

## 2019-08-06 DIAGNOSIS — Z87.891 FORMER SMOKER: ICD-10-CM

## 2019-08-06 DIAGNOSIS — I10 ESSENTIAL HYPERTENSION: ICD-10-CM

## 2019-08-06 DIAGNOSIS — R06.02 SHORTNESS OF BREATH: ICD-10-CM

## 2019-08-06 DIAGNOSIS — M54.2 NECK PAIN: ICD-10-CM

## 2019-08-06 DIAGNOSIS — J44.9 CHRONIC OBSTRUCTIVE PULMONARY DISEASE, UNSPECIFIED COPD TYPE (HCC): ICD-10-CM

## 2019-08-06 DIAGNOSIS — G47.34 NOCTURNAL HYPOXEMIA: ICD-10-CM

## 2019-08-06 DIAGNOSIS — L03.012 CELLULITIS OF FINGER OF LEFT HAND: ICD-10-CM

## 2019-08-06 DIAGNOSIS — G47.33 OSA (OBSTRUCTIVE SLEEP APNEA): ICD-10-CM

## 2019-08-06 DIAGNOSIS — G47.30 SLEEP APNEA, UNSPECIFIED TYPE: ICD-10-CM

## 2019-08-06 DIAGNOSIS — R21 RASH: ICD-10-CM

## 2019-08-06 DIAGNOSIS — J44.89 ASTHMA WITH CHRONIC OBSTRUCTIVE PULMONARY DISEASE (COPD): ICD-10-CM

## 2019-08-06 DIAGNOSIS — L92.0 GRANULOMA ANNULARE: ICD-10-CM

## 2019-08-06 DIAGNOSIS — E66.01 CLASS 3 SEVERE OBESITY DUE TO EXCESS CALORIES WITH SERIOUS COMORBIDITY AND BODY MASS INDEX (BMI) OF 60.0 TO 69.9 IN ADULT (HCC): ICD-10-CM

## 2019-08-06 PROBLEM — J45.30 ASTHMA IN ADULT, MILD PERSISTENT, UNCOMPLICATED: Status: RESOLVED | Noted: 2018-07-23 | Resolved: 2019-08-06

## 2019-08-06 PROCEDURE — 93000 ELECTROCARDIOGRAM COMPLETE: CPT | Performed by: PHYSICIAN ASSISTANT

## 2019-08-06 PROCEDURE — 99204 OFFICE O/P NEW MOD 45 MIN: CPT | Performed by: INTERNAL MEDICINE

## 2019-08-06 PROCEDURE — 99214 OFFICE O/P EST MOD 30 MIN: CPT | Performed by: PHYSICIAN ASSISTANT

## 2019-08-06 RX ORDER — ISOPROPYL ALCOHOL 0.7 ML/ML
SWAB TOPICAL
COMMUNITY
Start: 2019-08-05

## 2019-08-06 RX ORDER — BACLOFEN 10 MG/1
10 TABLET ORAL 2 TIMES DAILY PRN
Qty: 60 TAB | Refills: 0 | Status: SHIPPED | OUTPATIENT
Start: 2019-08-06 | End: 2019-09-03 | Stop reason: SDUPTHER

## 2019-08-06 RX ORDER — BACLOFEN 10 MG/1
10 TABLET ORAL 2 TIMES DAILY PRN
Qty: 60 TAB | Refills: 0 | Status: SHIPPED | OUTPATIENT
Start: 2019-08-06 | End: 2019-08-06 | Stop reason: SDUPTHER

## 2019-08-06 RX ORDER — ZOLPIDEM TARTRATE 5 MG/1
TABLET ORAL
Qty: 3 TAB | Refills: 0 | Status: SHIPPED
Start: 2019-08-06 | End: 2019-09-06

## 2019-08-06 SDOH — HEALTH STABILITY: MENTAL HEALTH: HOW OFTEN DO YOU HAVE 6 OR MORE DRINKS ON ONE OCCASION?: WEEKLY

## 2019-08-06 SDOH — HEALTH STABILITY: MENTAL HEALTH: HOW MANY STANDARD DRINKS CONTAINING ALCOHOL DO YOU HAVE ON A TYPICAL DAY?: 1 OR 2

## 2019-08-06 NOTE — PROGRESS NOTES
cc:  Cellulitis/rash hand    Subjective:     Eddie Ricardo is a 59 y.o. female presenting to follow up with cellulitis/rash of left hand      Patient presents the office today to follow-up with cellulitis/rash of her left hand.Her index finger is .  It is drying out but it is expanding.  Steroid cream burned signficantly.  Staying away from gluten which helped. She has an appointment with Dermatology on the 8th.  There is a family history of psoriasis, lupus, rheumatoid arthritis and crohns disease.  She also stopped b12 and states that it has improved since she stopped.      She states that she has been having cramping in the left side of her neck and it has been going on for months.  She states if she stays in a stretched position, it will improve but not always and she states that she will have to stretch for an extended period of time.  She states that she had an EKG in [place] in the Spring when she was in the ER, possibly in March. She states that she has been more congested and has needed more oxygen.  She acknowledges she has not been using her inhalers.  She has an appointment actually to see pulmonary medicine today.  She does have a history of asthma with chronic obstructive pulmonary disease.    Review of systems:  See above. Denies any other symptoms unless previously indicated.       Current Outpatient Medications:   •  Alcohol Swabs (PHARMACIST CHOICE ALCOHOL) Pads, , Disp: , Rfl:   •  zolpidem (AMBIEN) 5 MG Tab, Take 1-2 tablets by mouth every evening as needed for insomnia. Bring to sleep study., Disp: 3 Tab, Rfl: 0  •  triamcinolone acetonide (KENALOG) 0.1 % Ointment, Apply to affected area twice a day, Disp: 120 g, Rfl: 1  •  ibuprofen (MOTRIN) 800 MG Tab, Take 1 Tab by mouth every 8 hours as needed., Disp: 30 Tab, Rfl: 1  •  mupirocin (BACTROBAN) 2 % Ointment, Apply to affected area daily, Disp: 20 g, Rfl: 1  •  fluconazole (DIFLUCAN) 150 MG tablet, Take one tab weekly for 2 weeks.  (Patient not taking: Reported on 8/6/2019), Disp: 2 Tab, Rfl: 0  •  omeprazole (PRILOSEC) 40 MG delayed-release capsule, Take 1 Cap by mouth every day., Disp: 30 Cap, Rfl: 5  •  rosuvastatin (CRESTOR) 20 MG Tab, Take 1 Tab by mouth every evening., Disp: 30 Tab, Rfl: 5  •  montelukast (SINGULAIR) 10 MG Tab, Take 1 Tab by mouth every day., Disp: 30 Tab, Rfl: 5  •  furosemide (LASIX) 80 MG Tab, Take 1 Tab by mouth every day., Disp: 30 Tab, Rfl: 5  •  metformin (GLUCOPHAGE) 1000 MG tablet, Take 1 Tab by mouth 2 times a day, with meals., Disp: 60 Tab, Rfl: 5  •  amLODIPine (NORVASC) 10 MG Tab, Take 1 Tab by mouth every day., Disp: 30 Tab, Rfl: 5  •  gabapentin (NEURONTIN) 300 MG Cap, Take 1 Cap by mouth 3 times a day., Disp: 90 Cap, Rfl: 3  •  EASY TOUCH PEN NEEDLES 31G X 6 MM Misc, Use as directed, Disp: 100 Each, Rfl: 2  •  liraglutide (VICTOZA) 18 MG/3ML Solution Pen-injector injection, Inject 0.3 mL as instructed every day., Disp: 9 mL, Rfl: 5  •  PHARMACIST CHOICE ALCOHOL 70 % Pads, , Disp: , Rfl:   •  fluconazole (DIFLUCAN) 150 MG tablet, Take 1 Tab by mouth every day. (Patient not taking: Reported on 3/20/2019), Disp: 1 Tab, Rfl: 0  •  meclizine (ANTIVERT) 25 MG Tab, , Disp: , Rfl:   •  ondansetron (ZOFRAN) 4 MG Tab tablet, , Disp: , Rfl:   •  ipratropium (ATROVENT) 17 MCG/ACT Aero Soln, Inhale 2 Puffs by mouth every 6 hours., Disp: , Rfl:   •  ipratropium (ATROVENT) 0.02 % Solution, 1 mL by Nebulization route 4 times a day., Disp: 300 Bullet, Rfl: 6  •  albuterol (PROVENTIL) 2.5mg/3ml Nebu Soln solution for nebulization, 3 mL by Nebulization route every four hours as needed for Shortness of Breath., Disp: 300 Bullet, Rfl: 6  •  amoxicillin-clavulanate (AUGMENTIN) 875-125 MG Tab, Take 1 Tab by mouth 2 times a day. (Patient not taking: Reported on 3/20/2019), Disp: 20 Tab, Rfl: 0  •  fluticasone (FLONASE ALLERGY RELIEF) 50 MCG/ACT nasal spray, Spray 1 Spray in nose every day. (Patient not taking: Reported on  "4/22/2019), Disp: 16 g, Rfl: 6  •  Canagliflozin 100 MG Tab, Take 100 mg by mouth every day. (Patient not taking: Reported on 8/6/2019), Disp: 30 Tab, Rfl: 5  •  Insulin Degludec (TRESIBA FLEXTOUCH) 100 UNIT/ML Solution Pen-injector, Inject 69 Units as instructed every bedtime., Disp: 1 PEN, Rfl: 5  •  NON SPECIFIED, Inogen  Requesting Portable O2 with conserving device Requiring 3-5 L/nc 24/7  84% O2 sat on ambulation without O2 92% O2 sat with O2  Dx: R09.02, G47.33, J44.9, Disp: 1 Each, Rfl: 11  •  NON SPECIFIED, Tresiba injector needles 31 guage, 1/4\", Disp: 30 Each, Rfl: 11  •  Empagliflozin (JARDIANCE) 10 MG Tab, Take  by mouth., Disp: , Rfl:   •  albuterol 108 (90 Base) MCG/ACT Aero Soln inhalation aerosol, Inhale 2 Puffs by mouth every 6 hours as needed for Shortness of Breath., Disp: , Rfl:   •  aspirin EC (ECOTRIN) 81 MG Tablet Delayed Response, Take 81 mg by mouth every day., Disp: , Rfl:     Allergies, past medical history, past surgical history, family history, social history reviewed and updated    Objective:     Vitals: /65 (BP Location: Right arm, Patient Position: Sitting)   Pulse 84   Temp 37.2 °C (98.9 °F)   Resp 16   Ht 1.727 m (5' 8\")   Wt (!) 178.7 kg (394 lb)   SpO2 91%   BMI 59.91 kg/m²   General: Alert, pleasant, NAD  EYES:   PERRL, EOMI, no icterus or pallor.  Conjunctivae and lids normal.   HENT:  Normocephalic.  External ears normal.  No nasal drainage present    Oropharynx non-erythematous, mucous membranes moist.  Neck supple.  No carotid thrills or bruits.  Spasming posterior neck.  Heart: Regular rate and rhythm.  S1 and S2 normal.  No murmurs appreciated.  Respiratory: Decreased to absent breath sounds all lung fields.  Abdomen: Morbidly obese  Skin: Warm, dry, rash spreading dorsal surface of left hand.  Lesion still raised.  However they appear more dry.  Musculoskeletal: Gait is normal.  Moves all extremities well.  Neurological: No tremors, sensation grossly intact, " CN2-12 intact.  Psych:  Affect/mood is normal, judgement is good, memory is intact, grooming is appropriate.  EKG: Normal sinus normal rhythm with no ST elevation or depression.  Right axis deviation most likely due to weight.      Assessment/Plan:     Eddie was seen today for follow-up and preseptal cellulitis.    Diagnoses and all orders for this visit:    Rash  -     HARRIS COMPREHENSIVE PANEL  -     RHEUMATOID ARTHRITIS FACTOR; Future  -     WESTERGREN SED RATE; Future  -     TSH+FREE T4  Cellulitis of finger of left hand  Granuloma annulare    Etiology still unknown.  Patient has an appointment with dermatology in 2 days.  Will order lab work for further evaluation.  Follow-up in 4 weeks, sooner if needed.  Depending on results, may need to consider rheumatology and/or GI referral.    Neck pain  -     EKG  Cardiac ruled out.  Believe this is most likely muscle spasming.  We will try patient on baclofen and follow-up in 4 weeks sooner if needed.  Side effects discussed with patient.    Class 3 severe obesity due to excess calories with serious comorbidity and body mass index (BMI) of 60.0 to 69.9 in adult ([facility])  Shortness of breath  -     EKG  Asthma with chronic obstructive pulmonary disease (COPD) ([facility])  Patient has seen pulmonology today but would recommend that she begin inhaler use again.                  Return in about 4 weeks (around 9/3/2019), or if symptoms worsen or fail to improve.    Please note that this dictation was created using voice recognition software. I have made every reasonable attempt to correct obvious errors, but expect that there are errors of grammar and possible content that I did not discover before finalizing note.

## 2019-08-06 NOTE — PROGRESS NOTES
CC: Establish care for apnea hypopnea syndrome and nocturnal hypoxemia.    HPI:   Ms. Ricardo is a 59-year-old woman referred by Dr. Watters to assist in evaluation and management of known sleep disordered breathing.  This evaluation was conducted via telemedicine.    She had a long history of excessive daytime somnolence which was finally evaluated in 2006 in [facility].  At that time she was hospitalized for respiratory failure requiring mechanical ventilation and tracheostomy, and apparently related to MRSA pneumonia.  She underwent polysomnography and was started on BiPAP with supplemental oxygen.  Her machine was replaced 6 to 7 years ago.  She has lost about 60 pounds since that initial sleep evaluation.      She remains on the BiPAP with oxygen at 4 L/min each night, throughout the night, using a full facemask.  She is not having unusual problems with mask fit, leakage or airway dryness.  She has a regular sleep schedule, as confirmed by the sleep diary, with bedtime at about 9 PM and she falls asleep within about 30 minutes.  She sleeps through the night and awakens at 8 AM without fatigue, grogginess or morning headaches.  Her  notes that she does not snore on the BiPAP machine.  She denies significant daytime somnolence and her [place] sleepiness score is 0.  She believes that the BiPAP machine may be set for a pressure of 20/15 cmH2O.    She has been evaluated in the pulmonary clinic for chronic obstructive pulmonary disease.  She accumulated about 75 pack years of cigarette smoking before she quit in 2006.  She still has an occasional productive cough and stable exertional dyspnea.  She uses an albuterol inhaler rarely.  She also has some seasonal allergies and remains on montelukast and fluticasone nasal spray daily.  Her medical history is notable also for type 2 diabetes mellitus and systemic arterial hypertension.        Patient Active Problem List    Diagnosis Date Noted   • Rash  08/06/2019   • Neck pain 08/06/2019   • Shortness of breath 08/06/2019   • 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   • Cellulitis 07/17/2019   • Diabetes mellitus, insulin dependent (IDDM), controlled ([facility]) 03/20/2019   • Skin neoplasm 01/29/2019   • Drainage from ear, left 11/14/2018   • Asthma with chronic obstructive pulmonary disease (COPD) ([facility]) 09/12/2018   • Class 3 severe obesity due to excess calories with serious comorbidity and body mass index (BMI) of 60.0 to 69.9 in adult ([facility]) 09/12/2018   • Chronic bilateral low back pain with bilateral sciatica 07/24/2018   • HERIBERTO (obstructive sleep apnea) 07/23/2018   • Type 2 diabetes mellitus with ophthalmic complication, with long-term current use of insulin ([facility]) 07/23/2018   • Dyslipidemia 07/23/2018   • Essential hypertension 07/23/2018   • Cellulitis due to MRSA 07/23/2018       Past Medical History:   Diagnosis Date   • Allergic rhinitis    • Asthma in adult, mild persistent, uncomplicated 7/23/2018   • Asthma with chronic obstructive pulmonary disease (COPD) ([facility]) 9/12/2018   • Bronchitis    • Cellulitis due to MRSA 7/23/2018   • Chickenpox    • Chronic bilateral low back pain with bilateral sciatica 7/24/2018   • Class 3 severe obesity due to excess calories with serious comorbidity and body mass index (BMI) of 60.0 to 69.9 in adult ([facility]) 9/12/2018   • Diabetes mellitus, insulin dependent (IDDM), controlled ([facility]) 3/20/2019   • Drainage from ear, left 11/14/2018   • Dyslipidemia 7/23/2018   • Essential hypertension 7/23/2018   • Nasal drainage    • HERIBERTO (obstructive sleep apnea) 7/23/2018   • Sleep apnea    • Tonsillitis    • Type 2 diabetes mellitus with ophthalmic complication, with long-term current use of insulin ([facility]) 7/23/2018       Past Surgical History:   Procedure Laterality Date   • HYSTERECTOMY LAPAROSCOPY     • HYSTERECTOMY, VAGINAL Bilateral    • INTUBATION     • TONSILLECTOMY         Family History   Problem Relation Age of Onset   • Other Mother          Crohn's disease   • Hypertension Father    • Cancer Father         colon   • Heart Disease Father         CAD       Social History     Socioeconomic History   • Marital status:      Spouse name: Not on file   • Number of children: Not on file   • Years of education: Not on file   • Highest education level: Not on file   Occupational History   • Not on file   Social Needs   • Financial resource strain: Not on file   • Food insecurity:     Worry: Not on file     Inability: Not on file   • Transportation needs:     Medical: Not on file     Non-medical: Not on file   Tobacco Use   • Smoking status: Former Smoker     Years: 30.00     Types: Cigarettes     Last attempt to quit:      Years since quittin.6   • Smokeless tobacco: Never Used   Substance and Sexual Activity   • Alcohol use: Yes     Drinks per session: 1 or 2     Binge frequency: Weekly   • Drug use: No   • Sexual activity: Not Currently   Lifestyle   • Physical activity:     Days per week: Not on file     Minutes per session: Not on file   • Stress: Not on file   Relationships   • Social connections:     Talks on phone: Not on file     Gets together: Not on file     Attends [employer] service: Not on file     Active member of club or organization: Not on file     Attends meetings of clubs or organizations: Not on file     Relationship status: Not on file   • Intimate partner violence:     Fear of current or ex partner: Not on file     Emotionally abused: Not on file     Physically abused: Not on file     Forced sexual activity: Not on file   Other Topics Concern   • Not on file   Social History Narrative   • Not on file       Current Outpatient Medications   Medication Sig Dispense Refill   • triamcinolone acetonide (KENALOG) 0.1 % Ointment Apply to affected area twice a day 120 g 1   • ibuprofen (MOTRIN) 800 MG Tab Take 1 Tab by mouth every 8 hours as needed. 30 Tab 1   • omeprazole (PRILOSEC) 40 MG delayed-release capsule Take 1 Cap by mouth  every day. 30 Cap 5   • rosuvastatin (CRESTOR) 20 MG Tab Take 1 Tab by mouth every evening. 30 Tab 5   • montelukast (SINGULAIR) 10 MG Tab Take 1 Tab by mouth every day. 30 Tab 5   • furosemide (LASIX) 80 MG Tab Take 1 Tab by mouth every day. 30 Tab 5   • metformin (GLUCOPHAGE) 1000 MG tablet Take 1 Tab by mouth 2 times a day, with meals. 60 Tab 5   • amLODIPine (NORVASC) 10 MG Tab Take 1 Tab by mouth every day. 30 Tab 5   • gabapentin (NEURONTIN) 300 MG Cap Take 1 Cap by mouth 3 times a day. 90 Cap 3   • EASY TOUCH PEN NEEDLES 31G X 6 MM Misc Use as directed 100 Each 2   • liraglutide (VICTOZA) 18 MG/3ML Solution Pen-injector injection Inject 0.3 mL as instructed every day. 9 mL 5   • PHARMACIST CHOICE ALCOHOL 70 % Pads      • ondansetron (ZOFRAN) 4 MG Tab tablet      • ipratropium (ATROVENT) 0.02 % Solution 1 mL by Nebulization route 4 times a day. 300 Bullet 6   • albuterol (PROVENTIL) 2.5mg/3ml Nebu Soln solution for nebulization 3 mL by Nebulization route every four hours as needed for Shortness of Breath. 300 Bullet 6   • Insulin Degludec (TRESIBA FLEXTOUCH) 100 UNIT/ML Solution Pen-injector Inject 69 Units as instructed every bedtime. 1 PEN 5   • albuterol 108 (90 Base) MCG/ACT Aero Soln inhalation aerosol Inhale 2 Puffs by mouth every 6 hours as needed for Shortness of Breath.     • aspirin EC (ECOTRIN) 81 MG Tablet Delayed Response Take 81 mg by mouth every day.     • Alcohol Swabs (PHARMACIST CHOICE ALCOHOL) Pads      • mupirocin (BACTROBAN) 2 % Ointment Apply to affected area daily 20 g 1   • fluconazole (DIFLUCAN) 150 MG tablet Take one tab weekly for 2 weeks. (Patient not taking: Reported on 8/6/2019) 2 Tab 0   • fluconazole (DIFLUCAN) 150 MG tablet Take 1 Tab by mouth every day. (Patient not taking: Reported on 3/20/2019) 1 Tab 0   • meclizine (ANTIVERT) 25 MG Tab      • ipratropium (ATROVENT) 17 MCG/ACT Aero Soln Inhale 2 Puffs by mouth every 6 hours.     • amoxicillin-clavulanate (AUGMENTIN) 212-319  "MG Tab Take 1 Tab by mouth 2 times a day. (Patient not taking: Reported on 3/20/2019) 20 Tab 0   • fluticasone (FLONASE ALLERGY RELIEF) 50 MCG/ACT nasal spray Spray 1 Spray in nose every day. (Patient not taking: Reported on 4/22/2019) 16 g 6   • Canagliflozin 100 MG Tab Take 100 mg by mouth every day. (Patient not taking: Reported on 8/6/2019) 30 Tab 5   • NON SPECIFIED Inogen    Requesting Portable O2 with conserving device  Requiring 3-5 L/nc 24/7    84% O2 sat on ambulation without O2  92% O2 sat with O2    Dx: R09.02, G47.33, J44.9 1 Each 11   • NON SPECIFIED Tresiba injector needles  31 guage, 1/4\" 30 Each 11   • Empagliflozin (JARDIANCE) 10 MG Tab Take  by mouth.       No current facility-administered medications for this visit.     \"CURRENT RX\"      Allergies: Codeine and Latex      ROS  Positive for the sleep, respiratory, endocrine and cardiac issues reviewed above.  She wears corrective lenses but has no diplopia.  She has some loss of auditory acuity and the chronic rhinitis described above.  She has occasional palpitations without lightheadedness or syncope and has no exertional chest pain.  She denies heartburn or abdominal discomfort.  She does have some arthralgias and back all other aspects of the CPT review of systems process are negative as documented in the attached self history form.      Physical Exam:   /65 (BP Location: Right arm, Patient Position: Sitting, BP Cuff Size: Large adult)   Pulse 84   Resp 18   Ht 1.727 m (5' 8\")   Wt (!) 178.7 kg (394 lb)   SpO2 91%   BMI 59.91 kg/m²    Limited by the telemedicine interface.  Head neck examination demonstrates no mucosal lesion and a Mallampatti IV presentation.  On chest examination there are diminished but symmetrical bilateral breath sounds without rales, wheezing or cardiac examination the rhythm is regular without murmur, gallop or rub.  On neurologic examination the face and palate are symmetrical and there is no speech or " thought disorder.      Problems:  1. Sleep apnea, unspecified type  She has a 13-year history of significant sleep apnea hypopnea syndrome, apparently confirmed by polysomnography, and effectively treated with nocturnal BiPAP and oxygen.  Continued effective treatment is required and only to maintain daytime alertness but also to reduce the cardiac and neurologic risks associated with untreated sleep apnea hypopnea.  With her significant weight loss, reassessment of her sleep disordered breathing and retie trait therapy is required.    2. Nocturnal hypoxemia  Treated with nocturnal BiPAP and oxygen.  The polysomnogram will help to titrate effective therapy.    3. Chronic obstructive pulmonary disease, unspecified COPD type (HCC)  Stable without complicating factors and follow-up in the pulmonary clinic.  She is not smoking and is current on vaccination.    4. Essential hypertension  Controlled with blood pressure of 147/65 mmHg today.    5. Former smoker  She is encouraged to remain completely and permanently off cigarettes.    6. BMI 50.0-59.9, adult ([facility])  We have talked about the role of weight management in the treatment of sleep disordered breathing and the ways in which that might be accomplished.  She has lost a significant amount of weight as described above.      Plan:   1.  Polysomnogram, ideally a split-night study to restage the severity of the sleep disordered breathing and to recalibrate therapy.    2.  In the meantime she will continue with BiPAP and supplemental oxygen.  We will try to obtain the results of her previous sleep studies and information from her BiPAP data recorder.    3.  She will continue with the albuterol inhaler as needed and domiciliary oxygen with follow-up in the pulmonary clinic as scheduled.    4.  Return visit here after the sleep study to review results and treatment.    We appreciate the opportunity to assist in her care.

## 2019-08-08 ENCOUNTER — NON-PROVIDER VISIT (OUTPATIENT)
Dept: MEDICAL GROUP | Facility: CLINIC | Age: 59
End: 2019-08-08
Payer: MEDICARE

## 2019-08-08 PROCEDURE — 36415 COLL VENOUS BLD VENIPUNCTURE: CPT | Performed by: PHYSICIAN ASSISTANT

## 2019-08-09 LAB
CENTROMERE B AB SER-ACNC: <0.2 AI (ref 0–0.9)
CHROMATIN AB SERPL-ACNC: 0.2 AI (ref 0–0.9)
DSDNA AB SER-ACNC: <1 IU/ML (ref 0–9)
ENA JO1 AB SER-ACNC: <0.2 AI (ref 0–0.9)
ENA RNP AB SER-ACNC: <0.2 AI (ref 0–0.9)
ENA SCL70 AB SER-ACNC: <0.2 AI (ref 0–0.9)
ENA SM AB SER-ACNC: <0.2 AI (ref 0–0.9)
ENA SS-A AB SER-ACNC: <0.2 AI (ref 0–0.9)
ENA SS-B AB SER-ACNC: <0.2 AI (ref 0–0.9)
ERYTHROCYTE [SEDIMENTATION RATE] IN BLOOD BY WESTERGREN METHOD: 37 MM/HR (ref 0–40)
RHEUMATOID FACT SERPL-ACNC: <10 IU/ML (ref 0–13.9)
SEE BELOW  164869: NORMAL
T4 SERPL-MCNC: 7.3 UG/DL (ref 4.5–12)
TSH SERPL DL<=0.005 MIU/L-ACNC: 1.44 UIU/ML (ref 0.45–4.5)

## 2019-08-23 DIAGNOSIS — Z79.4 TYPE 2 DIABETES MELLITUS WITH HYPEROSMOLARITY WITHOUT COMA, WITH LONG-TERM CURRENT USE OF INSULIN (HCC): ICD-10-CM

## 2019-08-23 DIAGNOSIS — E11.00 TYPE 2 DIABETES MELLITUS WITH HYPEROSMOLARITY WITHOUT COMA, WITH LONG-TERM CURRENT USE OF INSULIN (HCC): ICD-10-CM

## 2019-08-23 NOTE — TELEPHONE ENCOUNTER
Was the patient seen in the last year in this department? Yes    Does patient have an active prescription for medications requested? Yes    Received Request Via: Pharmacy     Requested Prescriptions     Pending Prescriptions Disp Refills   • EASY TOUCH PEN NEEDLES 31G X 6 MM Misc 100 Each 2     Sig: Use as directed

## 2019-08-26 DIAGNOSIS — M25.50 MULTIPLE JOINT PAIN: ICD-10-CM

## 2019-08-26 RX ORDER — PEN NEEDLE, DIABETIC 31 G X1/4"
NEEDLE, DISPOSABLE MISCELLANEOUS
Qty: 100 EACH | Refills: 6 | Status: SHIPPED | OUTPATIENT
Start: 2019-08-26

## 2019-08-26 RX ORDER — IBUPROFEN 800 MG/1
800 TABLET ORAL EVERY 8 HOURS PRN
Qty: 30 TAB | Refills: 1 | Status: SHIPPED | OUTPATIENT
Start: 2019-08-26 | End: 2020-01-08 | Stop reason: SDUPTHER

## 2019-08-26 NOTE — TELEPHONE ENCOUNTER
Was the patient seen in the last year in this department? Yes    Does patient have an active prescription for medications requested? No     Received Request Via: Pharmacy     Requested Prescriptions     Pending Prescriptions Disp Refills   • ibuprofen (MOTRIN) 800 MG Tab 30 Tab 1     Sig: Take 1 Tab by mouth every 8 hours as needed.

## 2019-08-27 ENCOUNTER — APPOINTMENT (OUTPATIENT)
Dept: SLEEP MEDICINE | Facility: MEDICAL CENTER | Age: 59
End: 2019-08-27
Attending: INTERNAL MEDICINE
Payer: MEDICARE

## 2019-08-27 ENCOUNTER — APPOINTMENT (RX ONLY)
Dept: URBAN - NONMETROPOLITAN AREA CLINIC 15 | Facility: CLINIC | Age: 59
Setting detail: DERMATOLOGY
End: 2019-08-27

## 2019-08-27 DIAGNOSIS — R21 RASH AND OTHER NONSPECIFIC SKIN ERUPTION: ICD-10-CM

## 2019-08-27 PROBLEM — E13.9 OTHER SPECIFIED DIABETES MELLITUS WITHOUT COMPLICATIONS: Status: ACTIVE | Noted: 2019-08-27

## 2019-08-27 PROBLEM — J45.909 UNSPECIFIED ASTHMA, UNCOMPLICATED: Status: ACTIVE | Noted: 2019-08-27

## 2019-08-27 PROBLEM — J44.9 CHRONIC OBSTRUCTIVE PULMONARY DISEASE, UNSPECIFIED: Status: ACTIVE | Noted: 2019-08-27

## 2019-08-27 PROBLEM — E78.5 HYPERLIPIDEMIA, UNSPECIFIED: Status: ACTIVE | Noted: 2019-08-27

## 2019-08-27 PROBLEM — I10 ESSENTIAL (PRIMARY) HYPERTENSION: Status: ACTIVE | Noted: 2019-08-27

## 2019-08-27 PROBLEM — M12.9 ARTHROPATHY, UNSPECIFIED: Status: ACTIVE | Noted: 2019-08-27

## 2019-08-27 PROCEDURE — 11104 PUNCH BX SKIN SINGLE LESION: CPT

## 2019-08-27 PROCEDURE — ? BIOPSY BY PUNCH METHOD

## 2019-08-27 PROCEDURE — 11105 PUNCH BX SKIN EA SEP/ADDL: CPT

## 2019-08-27 PROCEDURE — ? COUNSELING

## 2019-08-27 ASSESSMENT — LOCATION ZONE DERM
LOCATION ZONE: FINGER
LOCATION ZONE: HAND

## 2019-08-27 ASSESSMENT — LOCATION DETAILED DESCRIPTION DERM
LOCATION DETAILED: LEFT PROXIMAL DORSAL INDEX FINGER
LOCATION DETAILED: LEFT RADIAL DORSAL HAND

## 2019-08-27 ASSESSMENT — LOCATION SIMPLE DESCRIPTION DERM
LOCATION SIMPLE: LEFT HAND
LOCATION SIMPLE: LEFT INDEX FINGER

## 2019-08-27 NOTE — PROCEDURE: BIOPSY BY PUNCH METHOD
Lab Facility: 
Home Suture Removal Text: Patient was provided a home suture removal kit and will remove their sutures at home.  If they have any questions or difficulties they will call the office.
Size Of Lesion In Cm (Optional): 0
Anesthesia Type: 1% lidocaine with epinephrine
Biopsy Type: H and E
Post-Care Instructions: I reviewed with the patient in detail post-care instructions. Patient is to keep the biopsy site dry overnight, and then apply bacitracin twice daily until healed. Patient may apply hydrogen peroxide soaks to remove any crusting.
Bill For Surgical Tray: no
Suture Removal: 14 days
Consent: Written consent was obtained and risks were reviewed including but not limited to scarring, infection, bleeding, scabbing, incomplete removal, nerve damage and allergy to anesthesia.
Notification Instructions: Patient will be notified of biopsy results. However, patient instructed to call the office if not contacted within 2 weeks.
Detail Level: Detailed
Anesthesia Volume In Cc: 0.5
Billing Type: Third-Party Bill
Epidermal Sutures: 4-0 Polysorb
Wound Care: Vaseline
Punch Size In Mm: 4
Hemostasis: None
Lab: [id number]
Dressing: bandage
Was A Bandage Applied: Yes

## 2019-08-27 NOTE — PROCEDURE: COUNSELING
Patient Specific Counseling (Will Not Stick From Patient To Patient): Advised patient to wash with benzoyl peroxide. Once bx comes back, medication will be called in.
Detail Level: Zone

## 2019-08-27 NOTE — HPI: SKIN LESIONS
Is This A New Presentation, Or A Follow-Up?: Skin Lesions
How Severe Is Your Skin Lesion?: moderate
Have Your Skin Lesions Been Treated?: not been treated
Additional History: Pt states that rash got worse when applying topical steroid creams.

## 2019-08-29 DIAGNOSIS — G62.9 NEUROPATHY: ICD-10-CM

## 2019-08-29 RX ORDER — GABAPENTIN 300 MG/1
300 CAPSULE ORAL 3 TIMES DAILY
Qty: 90 CAP | Refills: 1 | Status: SHIPPED | OUTPATIENT
Start: 2019-08-29 | End: 2019-10-29 | Stop reason: SDUPTHER

## 2019-09-03 ENCOUNTER — TELEPHONE (OUTPATIENT)
Dept: SLEEP MEDICINE | Facility: MEDICAL CENTER | Age: 59
End: 2019-09-03

## 2019-09-03 NOTE — TELEPHONE ENCOUNTER
Pt need SS closer to her home.   She is wanting to go to [facility] in [place].  Advised pt she needs to make sure we get a copy of that test before she follows up.    Order sent.

## 2019-09-07 ENCOUNTER — APPOINTMENT (RX ONLY)
Dept: URBAN - METROPOLITAN AREA CLINIC 4 | Facility: CLINIC | Age: 59
Setting detail: DERMATOLOGY
End: 2019-09-07

## 2019-09-11 ENCOUNTER — NON-PROVIDER VISIT (OUTPATIENT)
Dept: MEDICAL GROUP | Facility: CLINIC | Age: 59
End: 2019-09-11
Payer: MEDICARE

## 2019-09-11 ENCOUNTER — TELEMEDICINE2 (OUTPATIENT)
Dept: MEDICAL GROUP | Age: 59
End: 2019-09-11
Payer: MEDICARE

## 2019-09-11 ENCOUNTER — TELEMEDICINE ORIGINATING SITE VISIT (OUTPATIENT)
Dept: MEDICAL GROUP | Facility: CLINIC | Age: 59
End: 2019-09-11
Payer: MEDICARE

## 2019-09-11 VITALS
RESPIRATION RATE: 20 BRPM | WEIGHT: 293 LBS | HEART RATE: 86 BPM | OXYGEN SATURATION: 87 % | DIASTOLIC BLOOD PRESSURE: 62 MMHG | SYSTOLIC BLOOD PRESSURE: 168 MMHG | HEIGHT: 68 IN | TEMPERATURE: 98.4 F | BODY MASS INDEX: 44.41 KG/M2

## 2019-09-11 DIAGNOSIS — E66.01 CLASS 3 SEVERE OBESITY DUE TO EXCESS CALORIES WITH SERIOUS COMORBIDITY AND BODY MASS INDEX (BMI) OF 60.0 TO 69.9 IN ADULT (HCC): ICD-10-CM

## 2019-09-11 DIAGNOSIS — R73.9 HYPERGLYCEMIA: ICD-10-CM

## 2019-09-11 DIAGNOSIS — E08.3313 MODERATE NONPROLIFERATIVE DIABETIC RETINOPATHY OF BOTH EYES WITH MACULAR EDEMA ASSOCIATED WITH DIABETES MELLITUS DUE TO UNDERLYING CONDITION (HCC): ICD-10-CM

## 2019-09-11 DIAGNOSIS — L98.9 SKIN LESION OF HAND: ICD-10-CM

## 2019-09-11 PROBLEM — E08.3319: Status: ACTIVE | Noted: 2019-09-11

## 2019-09-11 LAB
HBA1C MFR BLD: 6.1 % (ref 0–5.6)
INT CON NEG: NEGATIVE
INT CON POS: POSITIVE

## 2019-09-11 PROCEDURE — 99214 OFFICE O/P EST MOD 30 MIN: CPT | Performed by: INTERNAL MEDICINE

## 2019-09-11 PROCEDURE — 92250 FUNDUS PHOTOGRAPHY W/I&R: CPT | Mod: TC | Performed by: INTERNAL MEDICINE

## 2019-09-11 PROCEDURE — 83036 HEMOGLOBIN GLYCOSYLATED A1C: CPT | Performed by: INTERNAL MEDICINE

## 2019-09-11 NOTE — NON-PROVIDER
Eddie Ricardo is a 59 y.o. female here for a non-provider visit for A1C    If abnormal was an in office provider notified today (if so, indicate provider)? Yes  Routed to PCP? Yes

## 2019-09-12 NOTE — PROGRESS NOTES
CC: 6-month follow-up visit    Verified identification with patient. Secured video conference with RN presenter in [facility]      HPI:   Eddie presents today with the following.    1. Class 3 severe obesity due to excess calories with serious comorbidity and body mass index (BMI) of 60.0 to 69.9 in adult ([facility])  Patient's current weight is 389 pounds, down from 294 pounds.  Patient has been on the gluten-free diet and investigating the ketogenic diet with her .    2. Diabetes mellitus, insulin dependent (IDDM), controlled ([facility])  Patient is currently treated with Victoza and Triseba.   Blood sugars have been stable in the 150s to 200s.  Hemoglobin A1c 6.1.  Patient treating with Crestor and apple cider vinegar.    3. Skin lesion of hand  Patient presents with skin lesions on her left hand that is followed by dermatologist in [place].  Biopsy done which showed Sweet's  Syndrome.  Patient will have repeat biopsy and cultures taken on September 25.  Autoimmune work-up has been negative.  No history of cancer.  Genetic testing negative.    4. Moderate nonproliferative diabetic retinopathy of both eyes with macular edema associated with diabetes mellitus due to underlying condition ([facility])  Unable to follow-up with ophthalmology.          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   • Cellulitis 07/17/2019   • Diabetes mellitus, insulin dependent (IDDM), controlled ([facility]) 03/20/2019   • Skin neoplasm 01/29/2019   • Drainage from ear, left 11/14/2018   • Asthma with chronic obstructive pulmonary disease (COPD) ([facility]) 09/12/2018   • Class 3 severe obesity due to excess calories with serious comorbidity and body mass  "index (BMI) of 60.0 to 69.9 in adult ([facility]) 09/12/2018   • Chronic bilateral low back pain with bilateral sciatica 07/24/2018   • HERIBERTO (obstructive sleep apnea) 07/23/2018   • Type 2 diabetes mellitus with ophthalmic complication, with long-term current use of insulin ([facility]) 07/23/2018   • Dyslipidemia 07/23/2018   • Essential hypertension 07/23/2018   • Cellulitis due to MRSA 07/23/2018       Current Outpatient Medications   Medication Sig Dispense Refill   • NON SPECIFIED Pen Needle 31G x 1/4\" (6mm) Easy Touch 30 Each 11   • NON SPECIFIED Pen Needle 31G x 1/4\" (6mm) Easy Touch 30 Each 11   • liraglutide (VICTOZA) 18 MG/3ML Solution Pen-injector injection Inject 0.3 mL as instructed every day. 9 mL 5   • baclofen (LIORESAL) 10 MG Tab Take 1 Tab by mouth 2 times a day as needed. 60 Tab 0   • gabapentin (NEURONTIN) 300 MG Cap Take 1 Cap by mouth 3 times a day. 90 Cap 1   • EASY TOUCH PEN NEEDLES 31G X 6 MM Misc Use as directed 100 Each 6   • ibuprofen (MOTRIN) 800 MG Tab Take 1 Tab by mouth every 8 hours as needed. 30 Tab 1   • Alcohol Swabs (PHARMACIST CHOICE ALCOHOL) Pads      • triamcinolone acetonide (KENALOG) 0.1 % Ointment Apply to affected area twice a day 120 g 1   • mupirocin (BACTROBAN) 2 % Ointment Apply to affected area daily 20 g 1   • fluconazole (DIFLUCAN) 150 MG tablet Take one tab weekly for 2 weeks. (Patient not taking: Reported on 8/6/2019) 2 Tab 0   • omeprazole (PRILOSEC) 40 MG delayed-release capsule Take 1 Cap by mouth every day. 30 Cap 5   • rosuvastatin (CRESTOR) 20 MG Tab Take 1 Tab by mouth every evening. 30 Tab 5   • montelukast (SINGULAIR) 10 MG Tab Take 1 Tab by mouth every day. 30 Tab 5   • furosemide (LASIX) 80 MG Tab Take 1 Tab by mouth every day. 30 Tab 5   • metformin (GLUCOPHAGE) 1000 MG tablet Take 1 Tab by mouth 2 times a day, with meals. 60 Tab 5   • amLODIPine (NORVASC) 10 MG Tab Take 1 Tab by mouth every day. 30 Tab 5   • PHARMACIST CHOICE ALCOHOL 70 % Pads      • fluconazole " "(DIFLUCAN) 150 MG tablet Take 1 Tab by mouth every day. (Patient not taking: Reported on 3/20/2019) 1 Tab 0   • meclizine (ANTIVERT) 25 MG Tab      • ondansetron (ZOFRAN) 4 MG Tab tablet      • ipratropium (ATROVENT) 17 MCG/ACT Aero Soln Inhale 2 Puffs by mouth every 6 hours.     • ipratropium (ATROVENT) 0.02 % Solution 1 mL by Nebulization route 4 times a day. 300 Bullet 6   • albuterol (PROVENTIL) 2.5mg/3ml Nebu Soln solution for nebulization 3 mL by Nebulization route every four hours as needed for Shortness of Breath. 300 Bullet 6   • fluticasone (FLONASE ALLERGY RELIEF) 50 MCG/ACT nasal spray Spray 1 Spray in nose every day. (Patient not taking: Reported on 4/22/2019) 16 g 6   • Insulin Degludec (TRESIBA FLEXTOUCH) 100 UNIT/ML Solution Pen-injector Inject 69 Units as instructed every bedtime. 1 PEN 5   • NON SPECIFIED Inogen    Requesting Portable O2 with conserving device  Requiring 3-5 L/nc 24/7    84% O2 sat on ambulation without O2  92% O2 sat with O2    Dx: R09.02, G47.33, J44.9 1 Each 11   • Empagliflozin (JARDIANCE) 10 MG Tab Take  by mouth.     • albuterol 108 (90 Base) MCG/ACT Aero Soln inhalation aerosol Inhale 2 Puffs by mouth every 6 hours as needed for Shortness of Breath.     • aspirin EC (ECOTRIN) 81 MG Tablet Delayed Response Take 81 mg by mouth every day.       No current facility-administered medications for this visit.          Allergies as of 09/11/2019 - Reviewed 09/11/2019   Allergen Reaction Noted   • Codeine  07/23/2018   • Latex  07/23/2018        ROS: As per HPI.  Denies all other cardiopulmonary, GI, neurologic symptoms.    BP (!) 168/62 (BP Location: Right arm, Patient Position: Sitting)   Pulse 86   Temp 36.9 °C (98.4 °F)   Resp 20   Ht 1.727 m (5' 8\")   Wt (!) 176.4 kg (389 lb)   SpO2 (!) 87%   BMI 59.15 kg/m²     Physical Exam:  Gen:         Alert and oriented, No apparent distress.  Neck:        No Lymphadenopathy or Bruits.  No thyromegaly.  Neck is supple  Lungs:     " "Clear to auscultation bilaterally, no wheezes rhonchi or crackles  CV:          Regular rate and rhythm. No murmurs, rubs or gallops.  Abd:         Soft non tender, non distended. Normal active bowel sounds.  No  Hepatosplenomegaly, No pulsatile masses.                   Ext:          No clubbing, cyanosis, edema.  Skin:         Left hand with nodular skin lesions.  No erythema.    Assessment and Plan.   59 y.o. female with the following issues.    1. Class 3 severe obesity due to excess calories with serious comorbidity and body mass index (BMI) of 60.0 to 69.9 in adult ([facility])  Discussed diet and lifestyle modifications  Trial with gluten-free/ketogenic diet    2. Diabetes mellitus, insulin dependent (IDDM), controlled ([facility])  Continue current plan of care  Retinal exam due    3. Skin lesion of hand  Keep follow-up appointment with dermatology for repeat biopsy and culture    4. Moderate nonproliferative diabetic retinopathy of both eyes with macular edema associated with diabetes mellitus due to underlying condition ([facility])    - POCT Retinal Eye Exam    5. IDDM (insulin dependent diabetes mellitus) ([facility])    - NON SPECIFIED; Pen Needle 31G x 1/4\" (6mm) Easy Touch  Dispense: 30 Each; Refill: 11  - NON SPECIFIED; Pen Needle 31G x 1/4\" (6mm) Easy Touch  Dispense: 30 Each; Refill: 11            Please note that this dictation was created using voice recognition software. I have made every reasonable attempt to correct obvious errors, but expect that there are errors of grammar and possible content that I did not discover before finalizing note.   "

## 2019-09-16 DIAGNOSIS — J01.01 ACUTE RECURRENT MAXILLARY SINUSITIS: ICD-10-CM

## 2019-09-16 DIAGNOSIS — J01.11 ACUTE RECURRENT FRONTAL SINUSITIS: ICD-10-CM

## 2019-09-17 RX ORDER — FLUTICASONE PROPIONATE 50 MCG
SPRAY, SUSPENSION (ML) NASAL
Qty: 1 BOTTLE | Refills: 2 | Status: SHIPPED | OUTPATIENT
Start: 2019-09-17

## 2019-09-17 NOTE — TELEPHONE ENCOUNTER
Was the patient seen in the last year in this department? Yes    Does patient have an active prescription for medications requested? Yes    Received Request Via: Pharmacy     Requested Prescriptions     Pending Prescriptions Disp Refills   • fluticasone (FLONASE) 50 MCG/ACT nasal spray [Pharmacy Med Name: FLUTICASONE 50 MCG NASAL SPR]  2     Sig: Instill 1 spray in each nostril daily.

## 2019-09-25 ENCOUNTER — HOSPITAL ENCOUNTER (OUTPATIENT)
Dept: LAB | Facility: MEDICAL CENTER | Age: 59
End: 2019-09-25
Attending: DERMATOLOGY
Payer: MEDICARE

## 2019-09-25 ENCOUNTER — APPOINTMENT (RX ONLY)
Dept: URBAN - NONMETROPOLITAN AREA CLINIC 15 | Facility: CLINIC | Age: 59
Setting detail: DERMATOLOGY
End: 2019-09-25

## 2019-09-25 DIAGNOSIS — R21 RASH AND OTHER NONSPECIFIC SKIN ERUPTION: ICD-10-CM

## 2019-09-25 PROCEDURE — 11104 PUNCH BX SKIN SINGLE LESION: CPT

## 2019-09-25 PROCEDURE — 87206 SMEAR FLUORESCENT/ACID STAI: CPT

## 2019-09-25 PROCEDURE — 87116 MYCOBACTERIA CULTURE: CPT

## 2019-09-25 PROCEDURE — 87102 FUNGUS ISOLATION CULTURE: CPT

## 2019-09-25 PROCEDURE — 87070 CULTURE OTHR SPECIMN AEROBIC: CPT

## 2019-09-25 PROCEDURE — 87015 SPECIMEN INFECT AGNT CONCNTJ: CPT

## 2019-09-25 PROCEDURE — 87205 SMEAR GRAM STAIN: CPT

## 2019-09-25 PROCEDURE — ? PUNCH BIOPSY FOR TISSUE CULTURE

## 2019-09-25 PROCEDURE — ? ORDER TESTS

## 2019-09-25 ASSESSMENT — LOCATION SIMPLE DESCRIPTION DERM: LOCATION SIMPLE: LEFT HAND

## 2019-09-25 ASSESSMENT — LOCATION ZONE DERM: LOCATION ZONE: HAND

## 2019-09-25 ASSESSMENT — LOCATION DETAILED DESCRIPTION DERM: LOCATION DETAILED: LEFT ULNAR DORSAL HAND

## 2019-09-25 NOTE — PROCEDURE: ORDER TESTS
Billing Type: Third-Party Bill
Lab Facility: [id number]
Bill For Surgical Tray: no
Performing Laboratory: [id number]
Expected Date Of Service: 09/26/2019

## 2019-09-25 NOTE — PROCEDURE: PUNCH BIOPSY FOR TISSUE CULTURE
Anesthesia Type: 1% lidocaine with epinephrine
Patient Will Remove Sutures At Home?: Yes
Wound Care: Petrolatum
Additional Anesthesia Volume In Cc (Will Not Render If 0): 0
Number Of Epidermal Sutures (Optional): 2
Detail Level: Detailed
Post-Care Instructions: I reviewed with the patient in detail post-care instructions. Patient is to keep the biopsy site dry overnight, and then apply bacitracin twice daily until healed. Patient may apply hydrogen peroxide soaks to remove any crusting.
Bill For Surgical Tray: no
Anesthesia Volume In Cc: 0.5
Home Suture Removal Text: Patient was provided a home suture removal kit and will remove their sutures at home.  If they have any questions or difficulties they will call the office.
Dressing: bandage
Suture Removal: 14 days
Notification Instructions: Patient will be notified of culture results when they are available. Patient was informed that tissue cultures can take some time to complete and that we will inform them when we get a result.
Consent: Written consent was obtained and risks were reviewed including but not limited to scarring, infection, bleeding, scabbing, nerve damage and allergy to anesthesia.
Hemostasis: None
Epidermal Sutures: 4-0 PDS
Procedure Information: This plan will only bill for the biopsy.  The individual tests must be ordered in the 'Order Test' plan.  Bacterial Tissue Cultures: 20474-3.  Fungal Tissue Cultures:  578-5.  Mycobacterium Tissue Cultures: 540-5.
Punch Size In Mm: 4

## 2019-09-26 LAB
GRAM STN SPEC: NORMAL
RHODAMINE-AURAMINE STN SPEC: NORMAL
SIGNIFICANT IND 70042: NORMAL
SIGNIFICANT IND 70042: NORMAL
SITE SITE: NORMAL
SITE SITE: NORMAL
SOURCE SOURCE: NORMAL
SOURCE SOURCE: NORMAL

## 2019-09-29 LAB
BACTERIA TISS AEROBE CULT: NORMAL
GRAM STN SPEC: NORMAL
SIGNIFICANT IND 70042: NORMAL
SITE SITE: NORMAL
SOURCE SOURCE: NORMAL

## 2019-10-01 ENCOUNTER — OFFICE VISIT (OUTPATIENT)
Dept: MEDICAL GROUP | Facility: CLINIC | Age: 59
End: 2019-10-01
Payer: MEDICARE

## 2019-10-01 VITALS
HEART RATE: 84 BPM | SYSTOLIC BLOOD PRESSURE: 147 MMHG | HEIGHT: 68 IN | TEMPERATURE: 97.6 F | WEIGHT: 293 LBS | RESPIRATION RATE: 18 BRPM | BODY MASS INDEX: 44.41 KG/M2 | DIASTOLIC BLOOD PRESSURE: 68 MMHG | OXYGEN SATURATION: 90 %

## 2019-10-01 DIAGNOSIS — M54.2 CERVICALGIA: ICD-10-CM

## 2019-10-01 DIAGNOSIS — Z12.31 VISIT FOR SCREENING MAMMOGRAM: ICD-10-CM

## 2019-10-01 DIAGNOSIS — G62.9 NEUROPATHY: ICD-10-CM

## 2019-10-01 DIAGNOSIS — H66.002 NON-RECURRENT ACUTE SUPPURATIVE OTITIS MEDIA OF LEFT EAR WITHOUT SPONTANEOUS RUPTURE OF TYMPANIC MEMBRANE: ICD-10-CM

## 2019-10-01 PROCEDURE — 99214 OFFICE O/P EST MOD 30 MIN: CPT | Performed by: PHYSICIAN ASSISTANT

## 2019-10-01 RX ORDER — AMOXICILLIN 875 MG/1
875 TABLET, COATED ORAL 2 TIMES DAILY
Qty: 20 TAB | Refills: 0 | Status: SHIPPED | OUTPATIENT
Start: 2019-10-01 | End: 2019-12-03

## 2019-10-01 RX ORDER — METHOCARBAMOL 750 MG/1
750 TABLET, FILM COATED ORAL 3 TIMES DAILY
Qty: 60 TAB | Refills: 2 | Status: SHIPPED | OUTPATIENT
Start: 2019-10-01

## 2019-10-01 RX ORDER — METHOCARBAMOL 750 MG/1
750 TABLET, FILM COATED ORAL 3 TIMES DAILY
Qty: 60 TAB | Refills: 2 | Status: SHIPPED | OUTPATIENT
Start: 2019-10-01 | End: 2019-10-01 | Stop reason: SDUPTHER

## 2019-10-01 NOTE — PROGRESS NOTES
"cc:  Neck and back pain    Subjective:     Eddie Ricardo is a 59 y.o. female presenting for neck and back pain      Patient presents to the office for neck and back pain.  She has been seeing dermatology for her hand and has been diagnosed with sweets syndrome.  She is still being evaluated by dermatology.  She also states that she was seen in the ER in [place] on the 29th.  She states that she is having left neck pain that is radiating down her arm and is having left neck swelling.  She states that she had lumps in her right axilla.  She was told that she has a pinched nerve.  It is worse to lay on her shoulder.  She states it burns down the front of her shoulder.  She is now feeling it on both sides.  She states she was told to see a neurologist for further testing.  She denies having any metal in the body.  She can be clausterphobic.  She states no xrays were done.  I am told by radiology tech that they have had difficulty x-raying her because of her density.    Review of systems:  See above.  Denies any other symptoms unless previously indicated.      Current Outpatient Medications:   •  methocarbamol (ROBAXIN) 750 MG Tab, Take 1 Tab by mouth 3 times a day., Disp: 60 Tab, Rfl: 2  •  amoxicillin (AMOXIL) 875 MG tablet, Take 1 Tab by mouth 2 times a day., Disp: 20 Tab, Rfl: 0  •  fluticasone (FLONASE) 50 MCG/ACT nasal spray, Instill 1 spray in each nostril daily., Disp: 1 Bottle, Rfl: 2  •  NON SPECIFIED, Pen Needle 31G x 1/4\" (6mm) Easy Touch, Disp: 30 Each, Rfl: 11  •  NON SPECIFIED, Pen Needle 31G x 1/4\" (6mm) Easy Touch, Disp: 30 Each, Rfl: 11  •  liraglutide (VICTOZA) 18 MG/3ML Solution Pen-injector injection, Inject 0.3 mL as instructed every day., Disp: 9 mL, Rfl: 5  •  gabapentin (NEURONTIN) 300 MG Cap, Take 1 Cap by mouth 3 times a day., Disp: 90 Cap, Rfl: 1  •  EASY TOUCH PEN NEEDLES 31G X 6 MM Misc, Use as directed, Disp: 100 Each, Rfl: 6  •  ibuprofen (MOTRIN) 800 MG Tab, Take 1 Tab by mouth every 8 " hours as needed., Disp: 30 Tab, Rfl: 1  •  Alcohol Swabs (PHARMACIST CHOICE ALCOHOL) Pads, , Disp: , Rfl:   •  triamcinolone acetonide (KENALOG) 0.1 % Ointment, Apply to affected area twice a day, Disp: 120 g, Rfl: 1  •  mupirocin (BACTROBAN) 2 % Ointment, Apply to affected area daily, Disp: 20 g, Rfl: 1  •  fluconazole (DIFLUCAN) 150 MG tablet, Take one tab weekly for 2 weeks. (Patient not taking: Reported on 8/6/2019), Disp: 2 Tab, Rfl: 0  •  omeprazole (PRILOSEC) 40 MG delayed-release capsule, Take 1 Cap by mouth every day., Disp: 30 Cap, Rfl: 5  •  rosuvastatin (CRESTOR) 20 MG Tab, Take 1 Tab by mouth every evening., Disp: 30 Tab, Rfl: 5  •  montelukast (SINGULAIR) 10 MG Tab, Take 1 Tab by mouth every day., Disp: 30 Tab, Rfl: 5  •  furosemide (LASIX) 80 MG Tab, Take 1 Tab by mouth every day., Disp: 30 Tab, Rfl: 5  •  metformin (GLUCOPHAGE) 1000 MG tablet, Take 1 Tab by mouth 2 times a day, with meals., Disp: 60 Tab, Rfl: 5  •  amLODIPine (NORVASC) 10 MG Tab, Take 1 Tab by mouth every day., Disp: 30 Tab, Rfl: 5  •  PHARMACIST CHOICE ALCOHOL 70 % Pads, , Disp: , Rfl:   •  fluconazole (DIFLUCAN) 150 MG tablet, Take 1 Tab by mouth every day. (Patient not taking: Reported on 3/20/2019), Disp: 1 Tab, Rfl: 0  •  meclizine (ANTIVERT) 25 MG Tab, , Disp: , Rfl:   •  ondansetron (ZOFRAN) 4 MG Tab tablet, , Disp: , Rfl:   •  ipratropium (ATROVENT) 17 MCG/ACT Aero Soln, Inhale 2 Puffs by mouth every 6 hours., Disp: , Rfl:   •  ipratropium (ATROVENT) 0.02 % Solution, 1 mL by Nebulization route 4 times a day., Disp: 300 Bullet, Rfl: 6  •  albuterol (PROVENTIL) 2.5mg/3ml Nebu Soln solution for nebulization, 3 mL by Nebulization route every four hours as needed for Shortness of Breath., Disp: 300 Bullet, Rfl: 6  •  Insulin Degludec (TRESIBA FLEXTOUCH) 100 UNIT/ML Solution Pen-injector, Inject 69 Units as instructed every bedtime., Disp: 1 PEN, Rfl: 5  •  NON SPECIFIED, Inogen  Requesting Portable O2 with conserving device  "Requiring 3-5 L/nc 24/7  84% O2 sat on ambulation without O2 92% O2 sat with O2  Dx: R09.02, G47.33, J44.9, Disp: 1 Each, Rfl: 11  •  Empagliflozin (JARDIANCE) 10 MG Tab, Take  by mouth., Disp: , Rfl:   •  albuterol 108 (90 Base) MCG/ACT Aero Soln inhalation aerosol, Inhale 2 Puffs by mouth every 6 hours as needed for Shortness of Breath., Disp: , Rfl:   •  aspirin EC (ECOTRIN) 81 MG Tablet Delayed Response, Take 81 mg by mouth every day., Disp: , Rfl:     Allergies, past medical history, past surgical history, family history, social history reviewed and updated    Objective:     Vitals: /68 (BP Location: Left arm, Patient Position: Sitting, BP Cuff Size: Large adult)   Pulse 84   Temp 36.4 °C (97.6 °F) (Temporal)   Resp 18   Ht 1.727 m (5' 8\")   Wt (!) 176.9 kg (390 lb)   SpO2 90%   BMI 59.30 kg/m²   General: Alert, pleasant, NAD  EYES:   PERRL, EOMI, no icterus or pallor.  Conjunctivae and lids normal.   HENT:  Normocephalic.  External ears normal.  Psoriasis in the ear canals bilaterally.  Left ear canal is erythematous and swollen.  Difficult to see tympanic membrane and left eardrum.  Right ear canal is normal.  Light reflex present in right ear canal.  No nasal drainage present.   Neck supple.  Decreased range of motion with right and left neck flexion.  No cervical or supraclavicular lymphadenopathy.  No right axillary adenopathy present.  Abdomen: Obese  Skin: Warm, dry, rash on hand improving.  Musculoskeletal: Gait is normal.  Moves all extremities well.  Equal motor strength with shoulder flexion extension abduction adduction 4/5.  4/5 motor strength with elbow flexion extension, 4/5 motor strength with wrist flexion extension.  Equal  strength bilaterally.  2+ radial pulses bilaterally.  Neurological: No tremors, sensation grossly intact, CN2-12 intact.  Psych:  Affect/mood is normal, judgement is good, memory is intact, grooming is appropriate.    Assessment/Plan:     Eddie was seen " today for follow-up.    Diagnoses and all orders for this visit:    Cervicalgia  -     MR-CERVICAL SPINE-W/O; Future  -     methocarbamol (ROBAXIN) 750 MG Tab; Take 1 Tab by mouth 3 times a day.  Neuropathy  -     methocarbamol (ROBAXIN) 750 MG Tab; Take 1 Tab by mouth 3 times a day.    We will obtain an MRI of the cervical spine to evaluate further.  We will try patient on Robaxin to see if this will help symptoms.  We will follow-up in 6 weeks, sooner if needed.    Visit for screening mammogram  -     MA-MAMMO SCREEN BILAT IMPLANTS EMERALD CAD; Future    Patient is reporting what sounds like lymph nodes in the right axilla.  Will obtain a mammogram to evaluate further as patient is due per    Non-recurrent acute suppurative otitis media of left ear without spontaneous rupture of tympanic membrane  -     amoxicillin (AMOXIL) 875 MG tablet; Take 1 Tab by mouth 2 times a day.  Amoxicillin as indicated.  We will follow-up if symptoms do not improve.    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      Return in about 6 weeks (around 11/12/2019).    Please note that this dictation was created using voice recognition software. I have made every reasonable attempt to correct obvious errors, but expect that there are errors of grammar and possible content that I did not discover before finalizing note.

## 2019-10-07 DIAGNOSIS — M54.2 NECK PAIN: ICD-10-CM

## 2019-10-07 RX ORDER — CYCLOBENZAPRINE HCL 10 MG
10 TABLET ORAL 3 TIMES DAILY PRN
Qty: 40 TAB | Refills: 2 | Status: SHIPPED | OUTPATIENT
Start: 2019-10-07

## 2019-10-07 NOTE — PROGRESS NOTES
Cyclobenzaprine sent to pharmacy as robaxin is not covered and patient has failed baclofen.  If she has failed cyclobenzaprine, let us know.  If she Rx was to go to location other than [facility], let us know.

## 2019-10-16 NOTE — PROGRESS NOTES
Have not heard from insurance cyclobenzaprine is not covered.  Will close at this time and re-address if patient has issues.

## 2019-10-23 LAB
FUNGUS SPEC CULT: NORMAL
SIGNIFICANT IND 70042: NORMAL
SITE SITE: NORMAL
SOURCE SOURCE: NORMAL

## 2019-10-28 DIAGNOSIS — G62.9 NEUROPATHY: ICD-10-CM

## 2019-10-29 RX ORDER — GABAPENTIN 300 MG/1
300 CAPSULE ORAL 3 TIMES DAILY
Qty: 90 CAP | Refills: 1 | Status: SHIPPED | OUTPATIENT
Start: 2019-10-29 | End: 2019-12-31 | Stop reason: SDUPTHER

## 2019-10-29 RX ORDER — GABAPENTIN 300 MG/1
300 CAPSULE ORAL 3 TIMES DAILY
Refills: 0 | OUTPATIENT
Start: 2019-10-29

## 2019-10-29 NOTE — TELEPHONE ENCOUNTER
Was the patient seen in the last year in this department? Yes    Does patient have an active prescription for medications requested? Yes    Received Request Via: Pharmacy     Requested Prescriptions     Pending Prescriptions Disp Refills   • gabapentin (NEURONTIN) 300 MG Cap 90 Cap 1     Sig: Take 1 Cap by mouth 3 times a day.     Refused Prescriptions Disp Refills   • gabapentin (NEURONTIN) 300 MG Cap [Pharmacy Med Name: GABAPENTIN 300 MG CAPSULE]  0     Sig: Take 1 Cap by mouth 3 times a day.     Refused By: TALA DEVINE     Reason for Refusal: Request already responded to by other means (e.g. phone or fax)

## 2019-11-15 ENCOUNTER — APPOINTMENT (OUTPATIENT)
Dept: RADIOLOGY | Facility: MEDICAL CENTER | Age: 59
End: 2019-11-15
Attending: PHYSICIAN ASSISTANT
Payer: MEDICARE

## 2019-11-21 LAB
MYCOBACTERIUM SPEC CULT: NORMAL
RHODAMINE-AURAMINE STN SPEC: NORMAL
SIGNIFICANT IND 70042: NORMAL
SITE SITE: NORMAL
SOURCE SOURCE: NORMAL

## 2019-11-25 NOTE — TELEPHONE ENCOUNTER
Was the patient seen in the last year in this department? Yes    Does patient have an active prescription for medications requested? No     Received Request Via: Pharmacy     Requested Prescriptions     Pending Prescriptions Disp Refills   • Insulin Degludec (TRESIBA FLEXTOUCH) 100 UNIT/ML Solution Pen-injector 1 PEN 5     Sig: Inject 69 Units as instructed every bedtime.

## 2019-12-03 ENCOUNTER — OFFICE VISIT (OUTPATIENT)
Dept: MEDICAL GROUP | Facility: CLINIC | Age: 59
End: 2019-12-03
Payer: MEDICARE

## 2019-12-03 VITALS
BODY MASS INDEX: 44.41 KG/M2 | HEART RATE: 82 BPM | HEIGHT: 68 IN | RESPIRATION RATE: 16 BRPM | TEMPERATURE: 97.8 F | SYSTOLIC BLOOD PRESSURE: 168 MMHG | OXYGEN SATURATION: 90 % | WEIGHT: 293 LBS | DIASTOLIC BLOOD PRESSURE: 83 MMHG

## 2019-12-03 DIAGNOSIS — J01.10 ACUTE NON-RECURRENT FRONTAL SINUSITIS: ICD-10-CM

## 2019-12-03 DIAGNOSIS — H66.006 RECURRENT ACUTE SUPPURATIVE OTITIS MEDIA WITHOUT SPONTANEOUS RUPTURE OF TYMPANIC MEMBRANE OF BOTH SIDES: ICD-10-CM

## 2019-12-03 DIAGNOSIS — L72.3 SEBACEOUS CYST: ICD-10-CM

## 2019-12-03 PROCEDURE — 99214 OFFICE O/P EST MOD 30 MIN: CPT | Performed by: PHYSICIAN ASSISTANT

## 2019-12-03 RX ORDER — SULFAMETHOXAZOLE AND TRIMETHOPRIM 800; 160 MG/1; MG/1
1 TABLET ORAL 2 TIMES DAILY
Qty: 16 TAB | Refills: 0 | Status: SHIPPED | OUTPATIENT
Start: 2019-12-03

## 2019-12-03 NOTE — PROGRESS NOTES
cc:  Feeling ill    Subjective:     Eddie Ricardo is a 59 y.o. female presenting for feeling ill      Patient presents to the office as she has been feeling ill. She states that she has been having ear pain since November 26th.  Patient states that she had 4 bactrim pills left from a previous prescription and started it last night.  She has not seen any difference.  She states that she has been having pain in the right ear with leakage.  The left has had leakage also but not as bad as the right.  She has felt hot but did not have a thermometer to take her temp.  She has pictures of cerumen she has removed.  She has been having sinus pain and congestion.      Patient has a lump on her left arm.  She states her  has been squeezing a yellow  Substance from it.  It fills up once it is empty and then becomes sore again.      Review of systems:  See above. Denies any other symptoms unless previously indicated.       Current Outpatient Medications:   •  sulfamethoxazole-trimethoprim (BACTRIM DS) 800-160 MG tablet, Take 1 Tab by mouth 2 times a day., Disp: 16 Tab, Rfl: 0  •  metformin (GLUCOPHAGE) 1000 MG tablet, Take 1 Tab by mouth 2 times a day, with meals., Disp: 60 Tab, Rfl: 4  •  furosemide (LASIX) 80 MG Tab, Take 1 Tab by mouth every day., Disp: 30 Tab, Rfl: 4  •  rosuvastatin (CRESTOR) 20 MG Tab, Take 1 Tab by mouth every evening., Disp: 30 Tab, Rfl: 4  •  amLODIPine (NORVASC) 10 MG Tab, Take 1 Tab by mouth every day., Disp: 30 Tab, Rfl: 4  •  montelukast (SINGULAIR) 10 MG Tab, Take 1 Tab by mouth every day., Disp: 30 Tab, Rfl: 4  •  omeprazole (PRILOSEC) 40 MG delayed-release capsule, Take 1 Cap by mouth every day., Disp: 30 Cap, Rfl: 4  •  Insulin Degludec (TRESIBA FLEXTOUCH) 100 UNIT/ML Solution Pen-injector, Inject 69 Units as instructed every bedtime., Disp: 1 PEN, Rfl: 5  •  gabapentin (NEURONTIN) 300 MG Cap, Take 1 Cap by mouth 3 times a day., Disp: 90 Cap, Rfl: 1  •  cyclobenzaprine (FLEXERIL) 10 MG  "Tab, Take 1 Tab by mouth 3 times a day as needed., Disp: 40 Tab, Rfl: 2  •  methocarbamol (ROBAXIN) 750 MG Tab, Take 1 Tab by mouth 3 times a day., Disp: 60 Tab, Rfl: 2  •  fluticasone (FLONASE) 50 MCG/ACT nasal spray, Instill 1 spray in each nostril daily., Disp: 1 Bottle, Rfl: 2  •  NON SPECIFIED, Pen Needle 31G x 1/4\" (6mm) Easy Touch, Disp: 30 Each, Rfl: 11  •  NON SPECIFIED, Pen Needle 31G x 1/4\" (6mm) Easy Touch, Disp: 30 Each, Rfl: 11  •  liraglutide (VICTOZA) 18 MG/3ML Solution Pen-injector injection, Inject 0.3 mL as instructed every day., Disp: 9 mL, Rfl: 5  •  EASY TOUCH PEN NEEDLES 31G X 6 MM Misc, Use as directed, Disp: 100 Each, Rfl: 6  •  ibuprofen (MOTRIN) 800 MG Tab, Take 1 Tab by mouth every 8 hours as needed., Disp: 30 Tab, Rfl: 1  •  Alcohol Swabs (PHARMACIST CHOICE ALCOHOL) Pads, , Disp: , Rfl:   •  triamcinolone acetonide (KENALOG) 0.1 % Ointment, Apply to affected area twice a day, Disp: 120 g, Rfl: 1  •  mupirocin (BACTROBAN) 2 % Ointment, Apply to affected area daily, Disp: 20 g, Rfl: 1  •  PHARMACIST CHOICE ALCOHOL 70 % Pads, , Disp: , Rfl:   •  ipratropium (ATROVENT) 17 MCG/ACT Aero Soln, Inhale 2 Puffs by mouth every 6 hours., Disp: , Rfl:   •  ipratropium (ATROVENT) 0.02 % Solution, 1 mL by Nebulization route 4 times a day., Disp: 300 Bullet, Rfl: 6  •  albuterol (PROVENTIL) 2.5mg/3ml Nebu Soln solution for nebulization, 3 mL by Nebulization route every four hours as needed for Shortness of Breath., Disp: 300 Bullet, Rfl: 6  •  NON SPECIFIED, Inogen  Requesting Portable O2 with conserving device Requiring 3-5 L/nc 24/7  84% O2 sat on ambulation without O2 92% O2 sat with O2  Dx: R09.02, G47.33, J44.9, Disp: 1 Each, Rfl: 11  •  Empagliflozin (JARDIANCE) 10 MG Tab, Take  by mouth., Disp: , Rfl:   •  albuterol 108 (90 Base) MCG/ACT Aero Soln inhalation aerosol, Inhale 2 Puffs by mouth every 6 hours as needed for Shortness of Breath., Disp: , Rfl:   •  aspirin EC (ECOTRIN) 81 MG Tablet " "Delayed Response, Take 81 mg by mouth every day., Disp: , Rfl:     Allergies, past medical history, past surgical history, family history, social history reviewed and updated    Objective:     Vitals: BP (!) 168/83 (BP Location: Right arm, Patient Position: Sitting)   Pulse 82   Temp 36.6 °C (97.8 °F) (Temporal)   Resp 16   Ht 1.727 m (5' 8\")   Wt (!) 177.4 kg (391 lb)   SpO2 90%   BMI 59.45 kg/m²   General: Alert, pleasant, NAD  EYES:   PERRL, EOMI, no icterus or pallor.  Conjunctivae and lids normal.   HENT:  Normocephalic.  External ears normal. Tympanic membranes pearly, opaque.  Swelling of the eardrums bilaterally with mild erythema present.  No nasal drainage present.  Frontal sinus tenderness upon palpation.  No tenderness with palpation of maxillary sinuses.  Oropharynx non-erythematous, mucous membranes dry.  Neck supple.   No cervical or supraclavicular lymphadenopathy.  Heart: Regular rate and rhythm.  S1 and S2 normal.  No murmurs appreciated.  Respiratory: Normal respiratory effort.  Clear to auscultation bilaterally.  Abdomen: Obese   Skin: Warm, dry, no rashes.  Pea-sized lump lateral upper left arm which feels cystic in nature.  Musculoskeletal: Gait is normal.  Moves all extremities well.  Neurological: No tremors, sensation grossly intact,  CN2-12 intact.  Psych:  Affect/mood is normal, judgement is good, memory is intact, grooming is appropriate.    Assessment/Plan:     Eddie was seen today for otalgia.    Diagnoses and all orders for this visit:    Acute non-recurrent frontal sinusitis  -     sulfamethoxazole-trimethoprim (BACTRIM DS) 800-160 MG tablet; Take 1 Tab by mouth 2 times a day.  Recurrent acute suppurative otitis media without spontaneous rupture of tympanic membrane of both sides  -     sulfamethoxazole-trimethoprim (BACTRIM DS) 800-160 MG tablet; Take 1 Tab by mouth 2 times a day.    As patient already started her Bactrim, we will finish up treatment.  However if symptoms do " not improve she is to contact this office and we will start her on Augmentin.  Follow-up in a week if not better.    Sebaceous cyst  Recommend she see dermatology as this may need removal.      Patient also complained of dry mouth.  Biotene OTC recommended per    No follow-ups on file.    Please note that this dictation was created using voice recognition software. I have made every reasonable attempt to correct obvious errors, but expect that there are errors of grammar and possible content that I did not discover before finalizing note.

## 2019-12-11 ENCOUNTER — TELEMEDICINE ORIGINATING SITE VISIT (OUTPATIENT)
Dept: MEDICAL GROUP | Facility: CLINIC | Age: 59
End: 2019-12-11
Payer: MEDICARE

## 2019-12-11 ENCOUNTER — TELEMEDICINE2 (OUTPATIENT)
Dept: MEDICAL GROUP | Age: 59
End: 2019-12-11
Payer: MEDICARE

## 2019-12-11 VITALS
HEART RATE: 76 BPM | OXYGEN SATURATION: 92 % | SYSTOLIC BLOOD PRESSURE: 137 MMHG | DIASTOLIC BLOOD PRESSURE: 71 MMHG | TEMPERATURE: 97.6 F | RESPIRATION RATE: 16 BRPM | BODY MASS INDEX: 59.45 KG/M2 | WEIGHT: 293 LBS

## 2019-12-11 DIAGNOSIS — E08.3311: ICD-10-CM

## 2019-12-11 DIAGNOSIS — E78.5 DYSLIPIDEMIA: ICD-10-CM

## 2019-12-11 DIAGNOSIS — J01.10 SUBACUTE FRONTAL SINUSITIS: ICD-10-CM

## 2019-12-11 DIAGNOSIS — E11.319 TYPE 2 DIABETES MELLITUS WITH RETINOPATHY OF RIGHT EYE, WITH LONG-TERM CURRENT USE OF INSULIN, MACULAR EDEMA PRESENCE UNSPECIFIED, UNSPECIFIED RETINOPATHY SEVERITY (HCC): ICD-10-CM

## 2019-12-11 DIAGNOSIS — Z79.4 TYPE 2 DIABETES MELLITUS WITH RETINOPATHY OF RIGHT EYE, WITH LONG-TERM CURRENT USE OF INSULIN, MACULAR EDEMA PRESENCE UNSPECIFIED, UNSPECIFIED RETINOPATHY SEVERITY (HCC): ICD-10-CM

## 2019-12-11 DIAGNOSIS — H65.23 BILATERAL CHRONIC SEROUS OTITIS MEDIA: ICD-10-CM

## 2019-12-11 LAB — RETINAL SCREEN: POSITIVE

## 2019-12-11 PROCEDURE — 99214 OFFICE O/P EST MOD 30 MIN: CPT | Performed by: INTERNAL MEDICINE

## 2019-12-11 RX ORDER — AMOXICILLIN AND CLAVULANATE POTASSIUM 875; 125 MG/1; MG/1
1 TABLET, FILM COATED ORAL 2 TIMES DAILY
Qty: 20 TAB | Refills: 0 | Status: SHIPPED | OUTPATIENT
Start: 2019-12-11

## 2019-12-11 NOTE — PROGRESS NOTES
"CC: Follow-up for ear pain.    Verified identification with patient. Secured video conference with RN presenter in [facility]      HPI:   Eddie presents today with the following.    1. Bilateral chronic serous otitis media  Patient was seen by Diana on December 3 for ongoing ear pain since November 26.  Patient completed Bactrim as prescribed however still with complaints of bilateral ear plugging, intermittent ear plugging, sinus tenderness across maxillary sinus, thick nasal drainage, headache upon certain movements, mild dizziness.  Patient states \"feels like there is water in my ears\".  INR here he did cardiocele good patient denies fever or chills, hearing loss or ear discharge/drainage.  PMH of allergies.  Patient has taken ibuprofen to help ease the discomfort.  Symptoms not resolved.    2. Diabetes mellitus, insulin dependent (IDDM), controlled ([facility])  Patient treating diabetes with Tresiba and Victoza   A1c 6.1 in September.  Patient is due for her lab work    3. Dyslipidemia  Patient taking Crestor 20 mg daily.    4. 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  Positive for left nonproliferative diabetic retinopathy.  Patient has noted visual changes recently.        Patient Active Problem List    Diagnosis Date Noted   • Bilateral chronic serous otitis media 12/11/2019   • Acute non-recurrent frontal sinusitis 12/03/2019   • Sebaceous cyst 12/03/2019   • Recurrent acute suppurative otitis media without spontaneous rupture of tympanic membrane of both sides 12/03/2019   • Neuropathy 10/01/2019   • Visit for screening mammogram 10/01/2019   • Skin lesion of hand 09/11/2019   • Moderate nonproliferative diabetic retinopathy with macular edema associated with diabetes mellitus due to underlying condition ([facility]) 09/11/2019   • Rash 08/06/2019   • Neck pain " 08/06/2019   • Shortness of breath 08/06/2019   • 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   • Cellulitis 07/17/2019   • Diabetes mellitus, insulin dependent (IDDM), controlled ([facility]) 03/20/2019   • Skin neoplasm 01/29/2019   • Drainage from ear, left 11/14/2018   • Asthma with chronic obstructive pulmonary disease (COPD) ([facility]) 09/12/2018   • Class 3 severe obesity due to excess calories with serious comorbidity and body mass index (BMI) of 60.0 to 69.9 in adult ([facility]) 09/12/2018   • Chronic bilateral low back pain with bilateral sciatica 07/24/2018   • HERIBERTO (obstructive sleep apnea) 07/23/2018   • Type 2 diabetes mellitus with ophthalmic complication, with long-term current use of insulin ([facility]) 07/23/2018   • Dyslipidemia 07/23/2018   • Essential hypertension 07/23/2018   • Cellulitis due to MRSA 07/23/2018       Current Outpatient Medications   Medication Sig Dispense Refill   • amoxicillin-clavulanate (AUGMENTIN) 875-125 MG Tab Take 1 Tab by mouth 2 times a day. 20 Tab 0   • amoxicillin-clavulanate (AUGMENTIN) 875-125 MG Tab Take 1 Tab by mouth 2 times a day. 20 Tab 0   • sulfamethoxazole-trimethoprim (BACTRIM DS) 800-160 MG tablet Take 1 Tab by mouth 2 times a day. 16 Tab 0   • metformin (GLUCOPHAGE) 1000 MG tablet Take 1 Tab by mouth 2 times a day, with meals. 60 Tab 4   • furosemide (LASIX) 80 MG Tab Take 1 Tab by mouth every day. 30 Tab 4   • rosuvastatin (CRESTOR) 20 MG Tab Take 1 Tab by mouth every evening. 30 Tab 4   • amLODIPine (NORVASC) 10 MG Tab Take 1 Tab by mouth every day. 30 Tab 4   • montelukast (SINGULAIR) 10 MG Tab Take 1 Tab by mouth every day. 30 Tab 4   • omeprazole (PRILOSEC) 40 MG delayed-release capsule Take 1 Cap by mouth every day. 30 Cap 4   • Insulin Degludec (TRESIBA FLEXTOUCH) 100 UNIT/ML Solution Pen-injector Inject 69 Units as instructed every bedtime. 1 PEN 5   • gabapentin (NEURONTIN) 300 MG Cap Take 1 Cap by mouth 3 times a day. 90 Cap 1   • cyclobenzaprine (FLEXERIL) 10 MG Tab Take 1 Tab by  "mouth 3 times a day as needed. 40 Tab 2   • methocarbamol (ROBAXIN) 750 MG Tab Take 1 Tab by mouth 3 times a day. 60 Tab 2   • fluticasone (FLONASE) 50 MCG/ACT nasal spray Instill 1 spray in each nostril daily. 1 Bottle 2   • NON SPECIFIED Pen Needle 31G x 1/4\" (6mm) Easy Touch 30 Each 11   • NON SPECIFIED Pen Needle 31G x 1/4\" (6mm) Easy Touch 30 Each 11   • liraglutide (VICTOZA) 18 MG/3ML Solution Pen-injector injection Inject 0.3 mL as instructed every day. 9 mL 5   • EASY TOUCH PEN NEEDLES 31G X 6 MM Misc Use as directed 100 Each 6   • ibuprofen (MOTRIN) 800 MG Tab Take 1 Tab by mouth every 8 hours as needed. 30 Tab 1   • Alcohol Swabs (PHARMACIST CHOICE ALCOHOL) Pads      • triamcinolone acetonide (KENALOG) 0.1 % Ointment Apply to affected area twice a day 120 g 1   • mupirocin (BACTROBAN) 2 % Ointment Apply to affected area daily 20 g 1   • PHARMACIST CHOICE ALCOHOL 70 % Pads      • ipratropium (ATROVENT) 17 MCG/ACT Aero Soln Inhale 2 Puffs by mouth every 6 hours.     • ipratropium (ATROVENT) 0.02 % Solution 1 mL by Nebulization route 4 times a day. 300 Bullet 6   • albuterol (PROVENTIL) 2.5mg/3ml Nebu Soln solution for nebulization 3 mL by Nebulization route every four hours as needed for Shortness of Breath. 300 Bullet 6   • NON SPECIFIED Inogen    Requesting Portable O2 with conserving device  Requiring 3-5 L/nc 24/7    84% O2 sat on ambulation without O2  92% O2 sat with O2    Dx: R09.02, G47.33, J44.9 1 Each 11   • Empagliflozin (JARDIANCE) 10 MG Tab Take  by mouth.     • albuterol 108 (90 Base) MCG/ACT Aero Soln inhalation aerosol Inhale 2 Puffs by mouth every 6 hours as needed for Shortness of Breath.     • aspirin EC (ECOTRIN) 81 MG Tablet Delayed Response Take 81 mg by mouth every day.       No current facility-administered medications for this visit.          Allergies as of 12/11/2019 - Reviewed 12/11/2019   Allergen Reaction Noted   • Codeine  07/23/2018   • Latex  07/23/2018        ROS: As per " HPI.  Denies all other cardiopulmonary, GI, neurologic symptoms.    /71 (BP Location: Right arm, Patient Position: Sitting)   Pulse 76   Temp 36.4 °C (97.6 °F) (Temporal)   Resp 16   Wt (!) 177.4 kg (391 lb)   SpO2 92%   BMI 59.45 kg/m²     Physical Exam:  Gen:         Alert and oriented, No apparent distress.  HEENT:        No Lymphadenopathy or Bruits.  Bilateral tympanic membranes are intact.  Minimal serous appearing tympanic membrane left greater than right.  Right ear canal with minimal debris, mild erythema of the ear canal.  No tenderness bilaterally to ear plug/examination.  No drainage noted.  Tenderness across maxillary sinus bilaterally.  Lungs:     Clear to auscultation bilaterally, no wheezes rhonchi or crackles  CV:          Regular rate and rhythm. No murmurs, rubs or gallops.  Abd:         Soft non tender, non distended. Normal active bowel sounds.  No  Hepatosplenomegaly, No pulsatile masses.                   Ext:          No clubbing, cyanosis, edema.      Assessment and Plan.   59 y.o. female with the following issues.    1. Bilateral chronic serous otitis media  Trial with Augmentin  If no response to antibiotic, continue to monitor.  Recommend ibuprofen, Nasonex, antihistamine  Consider referral to ENT if symptoms do not resolve    - amoxicillin-clavulanate (AUGMENTIN) 875-125 MG Tab; Take 1 Tab by mouth 2 times a day.  Dispense: 20 Tab; Refill: 0  - amoxicillin-clavulanate (AUGMENTIN) 875-125 MG Tab; Take 1 Tab by mouth 2 times a day.  Dispense: 20 Tab; Refill: 0    2. Diabetes mellitus, insulin dependent (IDDM), controlled ([facility])  Continue current plan of care    - HEMOGLOBIN A1C; Future  - Comp Metabolic Panel; Future  - MICROALB/CREAT RATIO, TIMED UR    3. Dyslipidemia    - Lipid Profile; Future    4. 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 Subacute frontal sinusitis    - amoxicillin-clavulanate (AUGMENTIN) 875-125 MG Tab; Take 1 Tab by mouth 2 times a day.  Dispense: 20 Tab; Refill: 0  - amoxicillin-clavulanate (AUGMENTIN) 875-125 MG Tab; Take 1 Tab by mouth 2 times a day.  Dispense: 20 Tab; Refill: 0            Please note that this dictation was created using voice recognition software. I have made every reasonable attempt to correct obvious errors, but expect that there are errors of grammar and possible content that I did not discover before finalizing note.

## 2019-12-18 ENCOUNTER — TELEPHONE (OUTPATIENT)
Dept: MEDICAL GROUP | Facility: CLINIC | Age: 59
End: 2019-12-18

## 2019-12-18 DIAGNOSIS — B37.9 YEAST INFECTION: ICD-10-CM

## 2019-12-18 RX ORDER — FLUCONAZOLE 150 MG/1
150 TABLET ORAL DAILY
Qty: 2 TAB | Refills: 0 | Status: SHIPPED | OUTPATIENT
Start: 2019-12-18

## 2019-12-18 RX ORDER — FLUCONAZOLE 150 MG/1
150 TABLET ORAL DAILY
Qty: 2 TAB | Refills: 0 | Status: SHIPPED
Start: 2019-12-18 | End: 2019-12-18 | Stop reason: SDUPTHER

## 2019-12-18 NOTE — TELEPHONE ENCOUNTER
Pt recently placed on antibiotic.  She now has a yeast infection, could you please prescribe her diflucan to treat this?

## 2019-12-31 DIAGNOSIS — G62.9 NEUROPATHY: ICD-10-CM

## 2019-12-31 RX ORDER — GABAPENTIN 300 MG/1
300 CAPSULE ORAL 3 TIMES DAILY
Qty: 90 CAP | Refills: 0 | Status: SHIPPED | OUTPATIENT
Start: 2019-12-31

## 2019-12-31 NOTE — TELEPHONE ENCOUNTER
Was the patient seen in the last year in this department? Yes    Does patient have an active prescription for medications requested? Yes    Received Request Via: Pharmacy     Requested Prescriptions     Pending Prescriptions Disp Refills   • gabapentin (NEURONTIN) 300 MG Cap 90 Cap 1     Sig: Take 1 Cap by mouth 3 times a day.

## 2020-01-03 DIAGNOSIS — M54.2 NECK PAIN: ICD-10-CM

## 2020-01-03 NOTE — TELEPHONE ENCOUNTER
Was the patient seen in the last year in this department? Yes    Does patient have an active prescription for medications requested? Yes    Received Request Via: Pharmacy     Requested Prescriptions     Pending Prescriptions Disp Refills   • cyclobenzaprine (FLEXERIL) 10 MG Tab 40 Tab 2     Sig: Take 1 Tab by mouth 3 times a day as needed.

## 2020-01-08 ENCOUNTER — NON-PROVIDER VISIT (OUTPATIENT)
Dept: MEDICAL GROUP | Facility: CLINIC | Age: 60
End: 2020-01-08
Payer: MEDICARE

## 2020-01-08 DIAGNOSIS — M25.50 MULTIPLE JOINT PAIN: ICD-10-CM

## 2020-01-08 PROCEDURE — 36415 COLL VENOUS BLD VENIPUNCTURE: CPT | Performed by: INTERNAL MEDICINE

## 2020-01-08 PROCEDURE — 99000 SPECIMEN HANDLING OFFICE-LAB: CPT | Performed by: INTERNAL MEDICINE

## 2020-01-08 RX ORDER — IBUPROFEN 800 MG/1
800 TABLET ORAL EVERY 8 HOURS PRN
Qty: 30 TAB | Refills: 1 | Status: SHIPPED
Start: 2020-01-08 | End: 2020-01-28 | Stop reason: SDUPTHER

## 2020-01-13 RX ORDER — CYCLOBENZAPRINE HCL 10 MG
10 TABLET ORAL 3 TIMES DAILY PRN
Qty: 40 TAB | Refills: 2 | OUTPATIENT
Start: 2020-01-13

## 2020-01-16 ENCOUNTER — TELEMEDICINE ORIGINATING SITE VISIT (OUTPATIENT)
Dept: MEDICAL GROUP | Facility: CLINIC | Age: 60
End: 2020-01-16
Payer: MEDICARE

## 2020-01-16 ENCOUNTER — TELEMEDICINE2 (OUTPATIENT)
Dept: MEDICAL GROUP | Age: 60
End: 2020-01-16
Payer: MEDICARE

## 2020-01-16 ENCOUNTER — NON-PROVIDER VISIT (OUTPATIENT)
Dept: MEDICAL GROUP | Facility: CLINIC | Age: 60
End: 2020-01-16
Payer: COMMERCIAL

## 2020-01-16 VITALS
WEIGHT: 293 LBS | DIASTOLIC BLOOD PRESSURE: 72 MMHG | HEART RATE: 84 BPM | TEMPERATURE: 97.6 F | SYSTOLIC BLOOD PRESSURE: 146 MMHG | OXYGEN SATURATION: 94 % | HEIGHT: 68 IN | RESPIRATION RATE: 20 BRPM | BODY MASS INDEX: 44.41 KG/M2

## 2020-01-16 DIAGNOSIS — E78.5 DYSLIPIDEMIA: ICD-10-CM

## 2020-01-16 DIAGNOSIS — R05.9 COUGH: ICD-10-CM

## 2020-01-16 DIAGNOSIS — Z79.4 TYPE 2 DIABETES MELLITUS WITH RETINOPATHY OF RIGHT EYE, WITH LONG-TERM CURRENT USE OF INSULIN, MACULAR EDEMA PRESENCE UNSPECIFIED, UNSPECIFIED RETINOPATHY SEVERITY (HCC): ICD-10-CM

## 2020-01-16 DIAGNOSIS — E66.01 CLASS 3 SEVERE OBESITY DUE TO EXCESS CALORIES WITH SERIOUS COMORBIDITY AND BODY MASS INDEX (BMI) OF 60.0 TO 69.9 IN ADULT (HCC): ICD-10-CM

## 2020-01-16 DIAGNOSIS — J40 BRONCHITIS: ICD-10-CM

## 2020-01-16 DIAGNOSIS — E11.319 TYPE 2 DIABETES MELLITUS WITH RETINOPATHY OF RIGHT EYE, WITH LONG-TERM CURRENT USE OF INSULIN, MACULAR EDEMA PRESENCE UNSPECIFIED, UNSPECIFIED RETINOPATHY SEVERITY (HCC): ICD-10-CM

## 2020-01-16 LAB
FLUAV+FLUBV AG SPEC QL IA: NEGATIVE
INT CON NEG: NEGATIVE
INT CON POS: POSITIVE

## 2020-01-16 PROCEDURE — 87804 INFLUENZA ASSAY W/OPTIC: CPT | Performed by: INTERNAL MEDICINE

## 2020-01-16 PROCEDURE — 99214 OFFICE O/P EST MOD 30 MIN: CPT | Mod: GT | Performed by: INTERNAL MEDICINE

## 2020-01-16 RX ORDER — AZITHROMYCIN 250 MG/1
TABLET, FILM COATED ORAL
Qty: 6 TAB | Refills: 0 | Status: SHIPPED | OUTPATIENT
Start: 2020-01-16

## 2020-01-16 ASSESSMENT — PATIENT HEALTH QUESTIONNAIRE - PHQ9: CLINICAL INTERPRETATION OF PHQ2 SCORE: 0

## 2020-01-16 NOTE — NON-PROVIDER
Eddie Ricardo is a 59 y.o. female here for a non-provider visit for Flu    If abnormal was an in office provider notified today (if so, indicate provider)? Yes  Routed to PCP? Yes

## 2020-01-19 NOTE — PROGRESS NOTES
CC: Follow-up, upper respiratory symptoms.    Verified identification with patient. Secured video conference with RN presenter in [facility]      HPI:   Eddie presents today with the following.    1. Bronchitis  Patient presents with complaints of increased shortness of breath, increased use of her oxygen up to 4 L.  Symptoms present for the last 3 to 4 days.  Patient started with fevers which have resolved and associated with chest tightness, wheezing and fatigue.  Mildly productive cough is present as well.  Patient did not receive her flu vaccine.  Patient has not been using her inhalers.    2. Class 3 severe obesity due to excess calories with serious comorbidity and body mass index (BMI) of 60.0 to 69.9 in adult ([facility])  Patient has been working on her weight loss.  Current weight is 378, down from 391.  Patient has been supplementing some meals with shakes and is trying to follow a keto diet.  Started exercising.    3. Diabetes mellitus, insulin dependent (IDDM), controlled ([facility])  Patient treated with Tresiba, Victoza and Glucophage.  Hemoglobin A1c 6.3.    4. Dyslipidemia  Patient taking Crestor 20 mg daily.  Total cholesterol 160, triglyceride 155, HDL 42, LDL 92.      Patient Active Problem List    Diagnosis Date Noted   • Bronchitis 01/16/2020   • Yeast infection 12/18/2019   • Bilateral chronic serous otitis media 12/11/2019   • Acute non-recurrent frontal sinusitis 12/03/2019   • Sebaceous cyst 12/03/2019   • Recurrent acute suppurative otitis media without spontaneous rupture of tympanic membrane of both sides 12/03/2019   • Neuropathy 10/01/2019   • Visit for screening mammogram 10/01/2019   • Skin lesion of hand 09/11/2019   • Moderate nonproliferative diabetic retinopathy with macular edema associated with diabetes mellitus due to underlying condition ([facility]) 09/11/2019   • Rash 08/06/2019   • Neck pain 08/06/2019   • Shortness of breath 08/06/2019   • 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   • Cellulitis  07/17/2019   • Diabetes mellitus, insulin dependent (IDDM), controlled ([facility]) 03/20/2019   • Skin neoplasm 01/29/2019   • Drainage from ear, left 11/14/2018   • Asthma with chronic obstructive pulmonary disease (COPD) ([facility]) 09/12/2018   • Class 3 severe obesity due to excess calories with serious comorbidity and body mass index (BMI) of 60.0 to 69.9 in adult ([facility]) 09/12/2018   • Chronic bilateral low back pain with bilateral sciatica 07/24/2018   • HERIBERTO (obstructive sleep apnea) 07/23/2018   • Type 2 diabetes mellitus with ophthalmic complication, with long-term current use of insulin ([facility]) 07/23/2018   • Dyslipidemia 07/23/2018   • Essential hypertension 07/23/2018   • Cellulitis due to MRSA 07/23/2018       Current Outpatient Medications   Medication Sig Dispense Refill   • azithromycin (ZITHROMAX) 250 MG Tab Take 2 tablets on day 1, then 1 tablet daily 6 Tab 0   • ibuprofen (MOTRIN) 800 MG Tab Take 1 Tab by mouth every 8 hours as needed. 30 Tab 1   • gabapentin (NEURONTIN) 300 MG Cap Take 1 Cap by mouth 3 times a day. 90 Cap 0   • fluconazole (DIFLUCAN) 150 MG tablet Take 1 Tab by mouth every day. Repeat in one week if not completely gone 2 Tab 0   • amoxicillin-clavulanate (AUGMENTIN) 875-125 MG Tab Take 1 Tab by mouth 2 times a day. 20 Tab 0   • amoxicillin-clavulanate (AUGMENTIN) 875-125 MG Tab Take 1 Tab by mouth 2 times a day. 20 Tab 0   • sulfamethoxazole-trimethoprim (BACTRIM DS) 800-160 MG tablet Take 1 Tab by mouth 2 times a day. 16 Tab 0   • metformin (GLUCOPHAGE) 1000 MG tablet Take 1 Tab by mouth 2 times a day, with meals. 60 Tab 4   • furosemide (LASIX) 80 MG Tab Take 1 Tab by mouth every day. 30 Tab 4   • rosuvastatin (CRESTOR) 20 MG Tab Take 1 Tab by mouth every evening. 30 Tab 4   • amLODIPine (NORVASC) 10 MG Tab Take 1 Tab by mouth every day. 30 Tab 4   • montelukast (SINGULAIR) 10 MG Tab Take 1 Tab by mouth every day. 30 Tab 4   • omeprazole (PRILOSEC) 40 MG delayed-release capsule Take 1 Cap by  "mouth every day. 30 Cap 4   • Insulin Degludec (TRESIBA FLEXTOUCH) 100 UNIT/ML Solution Pen-injector Inject 69 Units as instructed every bedtime. 1 PEN 5   • cyclobenzaprine (FLEXERIL) 10 MG Tab Take 1 Tab by mouth 3 times a day as needed. 40 Tab 2   • methocarbamol (ROBAXIN) 750 MG Tab Take 1 Tab by mouth 3 times a day. 60 Tab 2   • fluticasone (FLONASE) 50 MCG/ACT nasal spray Instill 1 spray in each nostril daily. 1 Bottle 2   • NON SPECIFIED Pen Needle 31G x 1/4\" (6mm) Easy Touch 30 Each 11   • NON SPECIFIED Pen Needle 31G x 1/4\" (6mm) Easy Touch 30 Each 11   • liraglutide (VICTOZA) 18 MG/3ML Solution Pen-injector injection Inject 0.3 mL as instructed every day. 9 mL 5   • EASY TOUCH PEN NEEDLES 31G X 6 MM Misc Use as directed 100 Each 6   • Alcohol Swabs (PHARMACIST CHOICE ALCOHOL) Pads      • triamcinolone acetonide (KENALOG) 0.1 % Ointment Apply to affected area twice a day 120 g 1   • mupirocin (BACTROBAN) 2 % Ointment Apply to affected area daily 20 g 1   • PHARMACIST CHOICE ALCOHOL 70 % Pads      • ipratropium (ATROVENT) 17 MCG/ACT Aero Soln Inhale 2 Puffs by mouth every 6 hours.     • ipratropium (ATROVENT) 0.02 % Solution 1 mL by Nebulization route 4 times a day. 300 Bullet 6   • albuterol (PROVENTIL) 2.5mg/3ml Nebu Soln solution for nebulization 3 mL by Nebulization route every four hours as needed for Shortness of Breath. 300 Bullet 6   • NON SPECIFIED Inogen    Requesting Portable O2 with conserving device  Requiring 3-5 L/nc 24/7    84% O2 sat on ambulation without O2  92% O2 sat with O2    Dx: R09.02, G47.33, J44.9 1 Each 11   • Empagliflozin (JARDIANCE) 10 MG Tab Take  by mouth.     • albuterol 108 (90 Base) MCG/ACT Aero Soln inhalation aerosol Inhale 2 Puffs by mouth every 6 hours as needed for Shortness of Breath.     • aspirin EC (ECOTRIN) 81 MG Tablet Delayed Response Take 81 mg by mouth every day.       No current facility-administered medications for this visit.          Allergies as of " "01/16/2020 - Reviewed 01/16/2020   Allergen Reaction Noted   • Codeine  07/23/2018   • Latex  07/23/2018        ROS: As per HPI.  Denies all other cardiopulmonary, GI, neurologic symptoms.    /72   Pulse 84   Temp 36.4 °C (97.6 °F)   Resp 20   Ht 1.727 m (5' 8\")   Wt (!) 171.5 kg (378 lb)   SpO2 94%   BMI 57.47 kg/m²     Physical Exam:  Gen:         Alert and oriented, No apparent distress.  HEENT:        No Lymphadenopathy or Bruits.  No thyromegaly.  Neck is supple.  Oropharynx with mild erythema, no exudate.  Moist mucous membranes.  No tenderness to maxillary sinus.  Lungs:     Upper airway rails otherwise good air excursion throughout.  No crackles at the bases.  CV:          Regular rate and rhythm. No murmurs, rubs or gallops.  Abd:         Soft non tender, non distended. Normal active bowel sounds.  No  Hepatosplenomegaly, No pulsatile masses.                   Ext:          No clubbing, cyanosis, edema.      Assessment and Plan.   59 y.o. female with the following issues.    1. Bronchitis  Rapid flu negative  Albuterol as needed  Keep well-hydrated    - azithromycin (ZITHROMAX) 250 MG Tab; Take 2 tablets on day 1, then 1 tablet daily  Dispense: 6 Tab; Refill: 0    2. Class 3 severe obesity due to excess calories with serious comorbidity and body mass index (BMI) of 60.0 to 69.9 in adult ([facility])  Continue with current weight loss efforts    3. Diabetes mellitus, insulin dependent (IDDM), controlled ([facility])  Continue current plan of care.  Monitor blood sugar regularly  Patient will need follow-up with ophthalmology  Briefly discussed principles of diabetic diet    4. Dyslipidemia  Stable, continue Crestor at current dose.            Please note that this dictation was created using voice recognition software. I have made every reasonable attempt to correct obvious errors, but expect that there are errors of grammar and possible content that I did not discover before finalizing note.   "

## 2020-01-28 DIAGNOSIS — M25.50 MULTIPLE JOINT PAIN: ICD-10-CM

## 2020-01-28 RX ORDER — IBUPROFEN 800 MG/1
800 TABLET ORAL EVERY 8 HOURS PRN
Qty: 30 TAB | Refills: 1 | Status: SHIPPED | OUTPATIENT
Start: 2020-01-28

## 2020-01-28 NOTE — TELEPHONE ENCOUNTER
Was the patient seen in the last year in this department? Yes    Does patient have an active prescription for medications requested? Yes    Received Request Via: Pharmacy     Requested Prescriptions     Pending Prescriptions Disp Refills   • ibuprofen (MOTRIN) 800 MG Tab 30 Tab 1     Sig: Take 1 Tab by mouth every 8 hours as needed.   • liraglutide (VICTOZA) 18 MG/3ML Solution Pen-injector 9 mL 5     Sig: Inject 1.8 mg as instructed every day.